# Patient Record
Sex: MALE | Race: BLACK OR AFRICAN AMERICAN | NOT HISPANIC OR LATINO | Employment: OTHER | URBAN - METROPOLITAN AREA
[De-identification: names, ages, dates, MRNs, and addresses within clinical notes are randomized per-mention and may not be internally consistent; named-entity substitution may affect disease eponyms.]

---

## 2022-10-04 ENCOUNTER — TELEPHONE (OUTPATIENT)
Dept: TRANSPLANT | Facility: CLINIC | Age: 67
End: 2022-10-04
Payer: COMMERCIAL

## 2022-10-04 NOTE — LETTER
October 4, 2022    Tj Olguin  [address]  [address]  [facility] [address]  Phone: [phone]  Fax: [phone]    Dear Dr. Tj Olguin:    Patient: Honorio Laguna  MR Number [id number]  Date of Birth 1955    Thank you for your referral of Honorio Laguna to our transplant program.      Your patient's information will be screened by our Referral Nurse Coordinators to determine initial medical candidacy and if any further records are required. We will contact you if further information is needed to process the referral.     Once all needed information is received it will be forwarded to our Transplant Financial Coordinators who will obtain insurance authorization. Upon insurance approval, your patient will be contacted by our  to schedule their appointments with the transplant team. When appointments are made you will receive a copy of the appointment letter that your patient will receive.     This process may take two to six weeks to complete.     In the event your patient is not a candidate a copy of our transplant programs opinion including reasons will be returned to you to comply with your yearly review regulations. A copy of that letter will also be sent to the patient.     The following information is needed to process a referral:  Medicare form 2728 for all patients on dialysis.  Dental clearance is required if your patient has primary Medicaid.  Current immunization record.  This information can be faxed to [phone].  Current Dietician evaluation can be faxed to [phone].    Thank you again for your trust in our program and the care of your patient.  If there is anything we can do for you or your staff, please feel free to contact us at [phone].    Sincerely,   [facility]  Kidney & Kidney/Pancreas Transplant Team  [address] [place] [address] [phone]

## 2022-10-04 NOTE — TELEPHONE ENCOUNTER
Contacted patient to review initial intake information. Patient reports the followin. Can you walk up a flight of stairs without getting short of breath or stopping? Yes   2. Can you walk one block without getting short of breath or having to stop? Yes   3. Do you use oxygen? No   4. Do you use a cane, walker, or wheel chair to assist in mobility? No   5. Have you been hospitalized or had recent surgery in the last 6 months? No    A. Stroke   B. Heart surgery or heart catheterization   C. Broken bone  6. Do you have any cuts, open sores (ulcers), or wounds anywhere on your body? No    7. Do you go to dialysis? No   8. Preferred appointment day? Anyday  9. Caregiver? Wife (Jennifer)    Patient is aware the next steps will include completing records and compliance verification. Patient is aware once provider review and insurance authorization is received we will contact patient to schedule initial visit. Patient is aware that initial visit will begin prior to / at 7 am and will conclude at approximately 3 pm on date of appointment. All questions answered at this time.

## 2022-10-12 ENCOUNTER — TELEPHONE (OUTPATIENT)
Dept: TRANSPLANT | Facility: CLINIC | Age: 67
End: 2022-10-12
Payer: COMMERCIAL

## 2022-11-15 DIAGNOSIS — Z76.82 ORGAN TRANSPLANT CANDIDATE: Primary | ICD-10-CM

## 2022-11-15 DIAGNOSIS — Z91.89 CARDIOVASCULAR EVENT RISK: ICD-10-CM

## 2022-11-15 DIAGNOSIS — Z01.810 HIGH RISK SURGERY, PRE-OPERATIVE CARDIOVASCULAR EXAMINATION: ICD-10-CM

## 2022-12-01 ENCOUNTER — TELEPHONE (OUTPATIENT)
Dept: TRANSPLANT | Facility: CLINIC | Age: 67
End: 2022-12-01
Payer: COMMERCIAL

## 2022-12-01 NOTE — LETTER
Date: 12/1/2022          Referral Process      To: Dialysis Unit  and Charge RN From: [facility] and      Kidney Transplant Nurse Coordinators    RE: Honorio Laguna, 1955, [id number]     At [facility], we conduct adherence checks as an important part of transplant care. Initial and listed patient assessments are not complete without adherence information.        Please complete the following information:                 Data from the last 3 months:  (data from last 3 months preferred):    Number of AMAs with dates, time, and reasons: ____________________________________________________    ______________________________________________________________________________________________    ______________________________________________________________________________________________    Number of No-Shows with dates and reasons: ______________________________________________________      ______________________________________________________________________________________________      Any concerns with Caregivers:  YES / NO    If yes, please explain:  ___________________________________________________________________________    ______________________________________________________________________________________________     Any concerns with Transportation:  YES / NO    If yes, please explain:  ___________________________________________________________________________    ______________________________________________________________________________________________    Any Psychiatric and/or Psychosocial concerns:  YES / NO     If yes, please explain: ___________________________________________________________________________    ______________________________________________________________________________________________      PLEASE RETURN TO: FAX: [phone]     Thank you for collaborating with us in the care of this patient.           [id number]  ?  [place] [address]  ?  phone [phone]  ?  fax [phone]  ?  [email or web address]  Confidentiality notice: The accompanying facsimile is intended solely for the use of the recipient designated above. Document(s) transmitted herewith may contain information that is confidential and privileged. Delivery, distribution or dissemination of this communication other than to the intended recipient is strictly prohibited. If you have received this facsimile in error, please notify us immediately by telephone.

## 2022-12-01 NOTE — LETTER
Date: 12/1/2022          Pre-Dialysis      To: Dialysis Unit  and Charge RN From: [facility] and      Kidney Transplant Nurse Coordinators    RE: Honorio Laguna, 1955, [id number]     At [facility], we conduct adherence checks as an important part of transplant care. Initial and listed patient assessments are not complete without adherence information.        Please complete the following information:                 Data from the last 3 months:  (data from last 3 months preferred):    Number of AMAs with dates, time, and reasons: ____________________________________________________    ______________________________________________________________________________________________    ______________________________________________________________________________________________    Number of No-Shows with dates and reasons: ______________________________________________________      ______________________________________________________________________________________________      Any concerns with Caregivers:  YES / NO    If yes, please explain:  ___________________________________________________________________________    ______________________________________________________________________________________________     Any concerns with Transportation:  YES / NO    If yes, please explain:  ___________________________________________________________________________    ______________________________________________________________________________________________    Any Psychiatric and/or Psychosocial concerns:  YES / NO     If yes, please explain: ___________________________________________________________________________    ______________________________________________________________________________________________      PLEASE RETURN TO: FAX: [phone]     Thank you for collaborating with us in the care of this patient.           [address]  ?  [place] [address]  ?  phone [phone]  ?  fax [phone]  ?  [email or web address]  Confidentiality notice: The accompanying facsimile is intended solely for the use of the recipient designated above. Document(s) transmitted herewith may contain information that is confidential and privileged. Delivery, distribution or dissemination of this communication other than to the intended recipient is strictly prohibited. If you have received this facsimile in error, please notify us immediately by telephone.

## 2022-12-05 ENCOUNTER — TELEPHONE (OUTPATIENT)
Dept: TRANSPLANT | Facility: CLINIC | Age: 67
End: 2022-12-05
Payer: COMMERCIAL

## 2022-12-12 DIAGNOSIS — Z76.82 ORGAN TRANSPLANT CANDIDATE: Primary | ICD-10-CM

## 2022-12-12 DIAGNOSIS — Z91.89 CARDIOVASCULAR EVENT RISK: ICD-10-CM

## 2022-12-12 DIAGNOSIS — Z01.810 HIGH RISK SURGERY, PRE-OPERATIVE CARDIOVASCULAR EXAMINATION: ICD-10-CM

## 2023-01-10 ENCOUNTER — HOSPITAL ENCOUNTER (OUTPATIENT)
Dept: RADIOLOGY | Facility: HOSPITAL | Age: 68
Discharge: HOME | End: 2023-01-10
Attending: NURSE PRACTITIONER
Payer: COMMERCIAL

## 2023-01-10 ENCOUNTER — OFFICE VISIT (OUTPATIENT)
Dept: TRANSPLANT | Facility: CLINIC | Age: 68
End: 2023-01-10
Payer: MEDICARE

## 2023-01-10 ENCOUNTER — HOSPITAL ENCOUNTER (OUTPATIENT)
Dept: RADIOLOGY | Facility: HOSPITAL | Age: 68
Discharge: HOME | End: 2023-01-10
Attending: NURSE PRACTITIONER
Payer: MEDICARE

## 2023-01-10 VITALS
HEART RATE: 68 BPM | WEIGHT: 228.19 LBS | TEMPERATURE: 97 F | HEIGHT: 72 IN | RESPIRATION RATE: 18 BRPM | BODY MASS INDEX: 30.91 KG/M2 | OXYGEN SATURATION: 97 % | SYSTOLIC BLOOD PRESSURE: 142 MMHG | DIASTOLIC BLOOD PRESSURE: 73 MMHG

## 2023-01-10 DIAGNOSIS — Z76.82 ORGAN TRANSPLANT CANDIDATE: ICD-10-CM

## 2023-01-10 DIAGNOSIS — Z91.89 CARDIOVASCULAR EVENT RISK: ICD-10-CM

## 2023-01-10 DIAGNOSIS — E11.65 UNCONTROLLED TYPE 2 DIABETES MELLITUS WITH HYPERGLYCEMIA: ICD-10-CM

## 2023-01-10 DIAGNOSIS — I12.9 HYPERTENSIVE RENAL DISEASE: ICD-10-CM

## 2023-01-10 DIAGNOSIS — N18.4 CKD (CHRONIC KIDNEY DISEASE), STAGE IV: ICD-10-CM

## 2023-01-10 DIAGNOSIS — Z01.810 HIGH RISK SURGERY, PRE-OPERATIVE CARDIOVASCULAR EXAMINATION: ICD-10-CM

## 2023-01-10 DIAGNOSIS — Z01.818 PRE-TRANSPLANT EVALUATION FOR KIDNEY TRANSPLANT: Primary | ICD-10-CM

## 2023-01-10 PROBLEM — E55.9 VITAMIN D DEFICIENCY: Status: ACTIVE | Noted: 2019-04-04

## 2023-01-10 PROBLEM — R80.1 PERSISTENT PROTEINURIA: Status: ACTIVE | Noted: 2019-04-04

## 2023-01-10 PROCEDURE — 99205 PR OFFICE/OUTPT VISIT, NEW, LEVL V, 60-74 MIN: ICD-10-PCS | Mod: S$PBB,TXP,, | Performed by: NURSE PRACTITIONER

## 2023-01-10 PROCEDURE — 71046 X-RAY EXAM CHEST 2 VIEWS: CPT | Mod: TC,TXP

## 2023-01-10 PROCEDURE — 99999 PR PBB SHADOW E&M-EST. PATIENT-LVL V: ICD-10-PCS | Mod: PBBFAC,TXP,, | Performed by: NURSE PRACTITIONER

## 2023-01-10 PROCEDURE — 72170 X-RAY EXAM OF PELVIS: CPT | Mod: 26,TXP,, | Performed by: RADIOLOGY

## 2023-01-10 PROCEDURE — 97802 MEDICAL NUTRITION INDIV IN: CPT | Mod: PBBFAC,TXP

## 2023-01-10 PROCEDURE — 99215 OFFICE O/P EST HI 40 MIN: CPT | Mod: PBBFAC,25,TXP | Performed by: NURSE PRACTITIONER

## 2023-01-10 PROCEDURE — 76770 US RETROPERITONEAL COMPLETE: ICD-10-PCS | Mod: 26,TXP,, | Performed by: RADIOLOGY

## 2023-01-10 PROCEDURE — 72170 XR PELVIS ROUTINE AP: ICD-10-PCS | Mod: 26,TXP,, | Performed by: RADIOLOGY

## 2023-01-10 PROCEDURE — 93978 VASCULAR STUDY: CPT | Mod: TC,TXP

## 2023-01-10 PROCEDURE — 93978 VASCULAR STUDY: CPT | Mod: 26,TXP,, | Performed by: RADIOLOGY

## 2023-01-10 PROCEDURE — 93978 US DOPP ILIACS BILATERAL: ICD-10-PCS | Mod: 26,TXP,, | Performed by: RADIOLOGY

## 2023-01-10 PROCEDURE — 72170 X-RAY EXAM OF PELVIS: CPT | Mod: TC,TXP

## 2023-01-10 PROCEDURE — 99999 PR PBB SHADOW E&M-EST. PATIENT-LVL V: CPT | Mod: PBBFAC,TXP,, | Performed by: NURSE PRACTITIONER

## 2023-01-10 PROCEDURE — 76770 US EXAM ABDO BACK WALL COMP: CPT | Mod: 26,TXP,, | Performed by: RADIOLOGY

## 2023-01-10 PROCEDURE — 99205 OFFICE O/P NEW HI 60 MIN: CPT | Mod: S$PBB,TXP,, | Performed by: NURSE PRACTITIONER

## 2023-01-10 PROCEDURE — 71046 XR CHEST PA AND LATERAL: ICD-10-PCS | Mod: 26,TXP,, | Performed by: RADIOLOGY

## 2023-01-10 PROCEDURE — 71046 X-RAY EXAM CHEST 2 VIEWS: CPT | Mod: 26,TXP,, | Performed by: RADIOLOGY

## 2023-01-10 PROCEDURE — 76770 US EXAM ABDO BACK WALL COMP: CPT | Mod: TC,TXP

## 2023-01-10 RX ORDER — FUROSEMIDE 80 MG/1
80 TABLET ORAL DAILY
COMMUNITY
Start: 2022-12-20

## 2023-01-10 RX ORDER — ATORVASTATIN CALCIUM 20 MG/1
20 TABLET, FILM COATED ORAL NIGHTLY
COMMUNITY
Start: 2022-09-11

## 2023-01-10 RX ORDER — ASPIRIN 81 MG/1
81 TABLET ORAL DAILY
COMMUNITY

## 2023-01-10 RX ORDER — LOSARTAN POTASSIUM 100 MG/1
100 TABLET ORAL DAILY
COMMUNITY
Start: 2022-12-08

## 2023-01-10 RX ORDER — CHOLECALCIFEROL (VITAMIN D3) 25 MCG
1000 TABLET ORAL DAILY
COMMUNITY

## 2023-01-10 RX ORDER — INSULIN GLARGINE AND LIXISENATIDE 100; 33 U/ML; UG/ML
45 INJECTION, SOLUTION SUBCUTANEOUS DAILY
COMMUNITY
Start: 2023-01-07

## 2023-01-10 RX ORDER — FOLIC ACID/MULTIVIT,IRON,MINER .4-18-35
1 TABLET,CHEWABLE ORAL DAILY
COMMUNITY

## 2023-01-10 RX ORDER — AMLODIPINE BESYLATE 10 MG/1
10 TABLET ORAL DAILY
COMMUNITY
Start: 2022-12-20

## 2023-01-10 RX ORDER — CARVEDILOL 25 MG/1
25 TABLET ORAL 2 TIMES DAILY
COMMUNITY
Start: 2022-12-05

## 2023-01-10 RX ORDER — TRIAMCINOLONE ACETONIDE 1 MG/G
CREAM TOPICAL
COMMUNITY
Start: 2022-10-03

## 2023-01-10 RX ORDER — ALLOPURINOL 100 MG/1
100 TABLET ORAL DAILY
COMMUNITY
Start: 2022-12-07

## 2023-01-10 NOTE — PROGRESS NOTES
PHARM.D. PRE-TRANSPLANT NOTE:    This patient's medication therapy was evaluated as part of his pre-transplant evaluation.      The following general pharmacologic concerns were noted: Aspirin will increase periop bleeding risk;     The following concerns for post-operative pain management were noted: N/A    The following pharmacologic concerns related to HCV therapy were noted: Atorvastatin interacts with DAA therapy therefore should be transitioned to rosuvastatin prior to initiating (max: 10 mg)      This patient's medication profile was reviewed for considerations for DAA Hepatitis C therapy:    [x]  No current inducers of CYP 3A4 or PGP  [x]  No amiodarone on this patient's EMR profile in the last 24 months  [x]  No past or current atrial fibrillation on this patient's EMR profile       Current Outpatient Medications   Medication Sig Dispense Refill    allopurinoL (ZYLOPRIM) 100 MG tablet Take 100 mg by mouth once daily.      amLODIPine (NORVASC) 10 MG tablet Take 10 mg by mouth once daily.      aspirin (ECOTRIN) 81 MG EC tablet Take 81 mg by mouth once daily.      atorvastatin (LIPITOR) 20 MG tablet Take 20 mg by mouth every evening.      carvediloL (COREG) 25 MG tablet Take 25 mg by mouth 2 (two) times daily.      furosemide (LASIX) 80 MG tablet Take 80 mg by mouth once daily.      losartan (COZAAR) 100 MG tablet Take 100 mg by mouth once daily.      multivit-min-iron-folic-vit K1 (CENTRUM CHEWABLES) 8 mg-400 mcg- 10 mcg Chew Take 1 tablet by mouth once daily.      SOLIQUA 100/33 100 unit-33 mcg/mL InPn pen Inject 45 Units into the skin once daily.      triamcinolone acetonide 0.1% (KENALOG) 0.1 % cream SMARTSIG:Sparingly Topical Twice Daily      vitamin D (VITAMIN D3) 1000 units Tab Take 1,000 Units by mouth once daily.       No current facility-administered medications for this visit.           I am available for consultation and can be contacted, as needed by the other members of the Transplant team.

## 2023-01-10 NOTE — LETTER
January 12, 2023        Tj Olguin  [address]  [address]  [facility] [address]  Phone: [phone]  Fax: [phone]             [facility]- Transplant 1st Fl  [address]  [facility] [address]  Phone: [phone]   Patient: Honorio Laguna   MR Number: [id number]   YOB: 1955   Date of Visit: 1/10/2023       Dear Dr. Tj Olguin    Thank you for referring Honorio Laguna to me for evaluation. Attached you will find relevant portions of my assessment and plan of care.    If you have questions, please do not hesitate to call me. I look forward to following Honorio Laguna along with you.    Sincerely,    Eveline Graham, CHEPE    Enclosure    If you would like to receive this communication electronically, please contact [email or web address] or [phone] to request [employer] access.    [employer] is a tool which provides read-only access to select patient information with whom you have a relationship. Its easy to use and provides real time access to review your patients record including encounter summaries, notes, results, and demographic information.    If you feel you have received this communication in error or would no longer like to receive these types of communications, please e-mail [email or web address]

## 2023-01-10 NOTE — PROGRESS NOTES
INITIAL PATIENT EDUCATION NOTE    Mr. Honorio Laguna was seen in pre-kidney transplant clinic for evaluation for kidney, kidney/pancreas or pancreas only transplant.  The patient attended a an individual video education session that discussed/reviewed the following aspects of transplantation: evaluation including diagnostic and laboratory testing,( Chemistries, Hematology, Serologies including HIV and Hepatitis and HLA) required for transplantation and selection committee process, UNOS waitlist management/multiple listings, types of organs offered (KDPI < 85%, KDPI > 85%, PHS risk, DCD, HCV+, HIV+ for HIV+ recipients and enbloc/dual), financial aspects, surgical procedures, dietary instruction pre- and post-transplant, health maintenance pre- and post-transplant, post-transplant hospitalization and outpatient follow-up, potential to participate in a research protocol, and medication management and side effects.  A question and answer session was provided after the presentation.    The patient was seen by all members of the multi-disciplinary team to include: Nephrologist/JENNIE, Surgeon, , Transplant Coordinator, , Pharmacist and Dietician (if applicable).    The patient reviewed and signed all consents for evaluation which were witnessed and sent to scanning into the [facility] chart.    The patient was given an education book and plan for further evaluation based on his individual assessment.      Reviewed program requirement for complete COVID vaccination with documentation prior to listing.  COVID education information reviewed with patient. Patient encouraged to be up to date on all vaccinations.       The patient was informed that the transplant team would manage immediate post op pain. If the patient requires long term pain management, they will need to have that pain management addressed by their PCP or previous provider who wrote for long term pain medicines.    The patient was  encouraged to call with any questions or concerns.

## 2023-01-10 NOTE — PROGRESS NOTES
Transplant Nephrology  Kidney Transplant Recipient Evaluation    Referring Physician: Tj Olguin  Current Nephrologist: Tj Olguin    Subjective:   CC:  Initial evaluation of kidney transplant candidacy.    HPI:  Mr. Laguna is a 67 y.o. year old Black or  male who has presented to be evaluated as a potential kidney transplant recipient.  He has advanced kidney disease secondary to presumed diabetic nephropathy and HTN, has not had a renal biopsy.  Patient is currently pre-dialysis, does not have a dialysis access.    DM2 and HTN since 2013.   On soliqua, A1c today 9.5. He is getting continuous glucose monitor soon. Denies retinopathy, neuropathy, or gastroparesis.    Works as a . Able to climb a flight of stairs without CP or SOB. Looks good, not frail.    Last colonoscopy done at [facility], reports good for 10 years.    Denies MI, CVA, DVT/PE, or malignancy history.    No potential donors at  this time.    Current Outpatient Medications   Medication Sig Dispense Refill    allopurinoL (ZYLOPRIM) 100 MG tablet Take 100 mg by mouth once daily.      amLODIPine (NORVASC) 10 MG tablet Take 10 mg by mouth once daily.      aspirin (ECOTRIN) 81 MG EC tablet Take 81 mg by mouth once daily.      atorvastatin (LIPITOR) 20 MG tablet Take 20 mg by mouth every evening.      carvediloL (COREG) 25 MG tablet Take 25 mg by mouth 2 (two) times daily.      furosemide (LASIX) 80 MG tablet Take 80 mg by mouth once daily.      losartan (COZAAR) 100 MG tablet Take 100 mg by mouth once daily.      multivit-min-iron-folic-vit K1 (CENTRUM CHEWABLES) 8 mg-400 mcg- 10 mcg Chew Take 1 tablet by mouth once daily.      SOLIQUA 100/33 100 unit-33 mcg/mL InPn pen Inject 45 Units into the skin once daily.      triamcinolone acetonide 0.1% (KENALOG) 0.1 % cream SMARTSIG:Sparingly Topical Twice Daily      vitamin D (VITAMIN D3) 1000 units Tab Take 1,000 Units by mouth once daily.       No current  facility-administered medications for this visit.       Past Medical History:   Diagnosis Date    Cataract     Diabetes mellitus     Diabetes mellitus, type 2     Disorder of kidney and ureter     Hyperlipidemia     Hypertension     Hyperuricemia     Skin plaque     Vitamin D deficiency      Past Medical and Surgical History: Mr. Laguna  has a past medical history of Cataract, Diabetes mellitus, Diabetes mellitus, type 2, Disorder of kidney and ureter, Hyperlipidemia, Hypertension, Hyperuricemia, Skin plaque, and Vitamin D deficiency.  He has a past surgical history that includes Cataract extraction (Bilateral).    Past Social and Family History: Mr. Laguna reports that he quit smoking about 32 years ago. His smoking use included cigarettes. He has never used smokeless tobacco. He reports that he does not currently use alcohol. He reports that he does not use drugs. His family history includes Diabetes in his brother; Kidney disease in his brother.    Review of Systems   Constitutional:  Positive for fatigue. Negative for activity change, appetite change and fever.   HENT:  Negative for congestion, mouth sores and sore throat.    Eyes:  Negative for visual disturbance.   Respiratory:  Negative for cough, chest tightness and shortness of breath.    Cardiovascular:  Negative for chest pain, palpitations and leg swelling.   Gastrointestinal:  Negative for abdominal distention, abdominal pain, constipation, diarrhea and nausea.   Genitourinary:  Negative for difficulty urinating, frequency and hematuria.   Musculoskeletal:  Negative for arthralgias and gait problem.   Skin:  Negative for wound.   Allergic/Immunologic: Negative for environmental allergies, food allergies and immunocompromised state.   Neurological:  Negative for dizziness, weakness and numbness.   Psychiatric/Behavioral:  Negative for sleep disturbance. The patient is not nervous/anxious.      Objective:   Blood pressure (!) 142/73, pulse 68, temperature  "97.3 °F (36.3 °C), temperature source Temporal, resp. rate 18, height 6' 0.13" (1.832 m), weight 103.5 kg (228 lb 2.8 oz), SpO2 97 %.body mass index is 30.84 kg/m².    Physical Exam  Vitals and nursing note reviewed.   Constitutional:       Appearance: Normal appearance.   HENT:      Head: Normocephalic.   Cardiovascular:      Rate and Rhythm: Normal rate and regular rhythm.      Heart sounds: Normal heart sounds.   Pulmonary:      Effort: Pulmonary effort is normal.      Breath sounds: Normal breath sounds.   Abdominal:      General: Bowel sounds are normal. There is no distension.      Palpations: Abdomen is soft.      Tenderness: There is no abdominal tenderness.   Musculoskeletal:         General: Normal range of motion.   Skin:     General: Skin is warm and dry.   Neurological:      General: No focal deficit present.      Mental Status: He is alert.   Psychiatric:         Behavior: Behavior normal.       Labs:  Lab Results   Component Value Date    WBC 6.77 01/10/2023    HGB 12.9 (L) 01/10/2023    HCT 38.6 (L) 01/10/2023     01/10/2023    K 4.0 01/10/2023     01/10/2023    CO2 29 01/10/2023    BUN 43 (H) 01/10/2023    CREATININE 3.8 (H) 01/10/2023    CALCIUM 9.7 01/10/2023    PHOS 3.7 01/10/2023    ALBUMIN 3.8 01/10/2023    AST 29 01/10/2023    ALT 28 01/10/2023    .8 (H) 01/10/2023       Labs were reviewed with the patient.    Assessment:     1. Pre-transplant evaluation for kidney transplant    2. CKD (chronic kidney disease), stage IV    3. Uncontrolled type 2 diabetes mellitus with hyperglycemia    4. Hypertensive renal disease    5. BMI 30.0-30.9,adult        Plan:   67 y.o. year old Black or  male who has presented to be evaluated as a potential kidney transplant recipient.  He has advanced kidney disease secondary to presumed diabetic nephropathy and HTN, has not had a renal biopsy.  Needs colonoscopy records from [facility].    A1c 9.5 today, encouraged better DM2 control. " He is waiting on continuous glucose monitor.     Transplant Candidacy:   Based on available information, Mr. Laguna is a suitable kidney transplant candidate.   Meets center eligibility for accepting HCV+ donor offer - yes.  Patient educated on HCV+ donors. Honorio is willing to accept HCV+ donor offer - yes   Patient is a candidate for KDPI > 85 kidney donor offer - no (weight > 88 kg)  Final determination of transplant candidacy will be made once workup is complete and reviewed by the selection committee.    Patient advised that it is recommended that all transplant candidates, and their close contacts and household members receive Covid vaccination.    Eveline Graham, CHEPE       OS Patient Status  Functional Status: 90% - Able to carry on normal activity: minor symptoms of disease  Physical Capacity: No Limitations

## 2023-01-10 NOTE — PROGRESS NOTES
Transplant Surgery  Kidney Transplant Recipient Evaluation    Referring Physician: Tj Olguin  Current Nephrologist: Tj Olguin    Subjective:     Reason for Visit: evaluate transplant candidacy    History of Present Illness: Honorio Laguna is a 67 y.o. year old male undergoing transplant evaluation.    Dialysis History: Honorio is pre-dialysis.      Transplant History: N/A    Etiology of Renal Disease: Diabetes Mellitus - Type II (based on medical records from referral).    External provider notes reviewed: Yes    Review of Systems   Constitutional:  Positive for fatigue.   Respiratory: Negative.     Cardiovascular: Negative.    Gastrointestinal: Negative.    Genitourinary: Negative.    Objective:     Physical Exam:  Constitutional:   Vitals reviewed: yes   Well-nourished and well-groomed: yes  Eyes:   Sclerae icteric: no   Extraocular movements intact: yes  GI:    Bowel sounds normal: yes   Tenderness: no    If yes, quadrant/location: not applicable   Palpable masses: no    If yes, quadrant/location: not applicable   Hepatosplenomegaly: no   Ascites: no   Hernia: no    If yes, type/location: not applicable   Surgical scars: no    If yes, type/location: not applicable  Resp:   Effort normal: yes   Breath sounds normal: yes    CV:   Regular rate and rhythm: yes   Heart sounds normal: yes   Femoral pulses normal: yes   Extremities edematous: no  Skin:   Rashes or lesions present: no    If yes, describe:not applicable   Jaundice:: no    Musculoskeletal:   Gait normal: yes   Strength normal: yes  Psych:   Oriented to person, place, and time: yes   Affect and mood normal: yes    Additional comments: not applicable    Diagnostics:  The following labs have been reviewed: CBC  CMP  PT  INR  The following radiology images have been independently reviewed and interpreted: Pending    Counseling: We provided Honorio Laguna with a group education session today.  We discussed kidney transplantation at length with him, including  risks, potential complications, and alternatives in the management of his renal failure.  The discussion included complications related to anesthesia, bleeding, infection, primary nonfunction, and ATN.  I discussed the typical postoperative course, length of hospitalization, the need for long-term immunosuppression, and the need for long-term routine follow-up.  I discussed living-donor and -donor transplantation and the relative advantages and disadvantages of each.  I also discussed average waiting times for both living donation and  donation.  I discussed national and center-specific survival rates.  I also mentioned the potential benefit of multicenter listing to candidates listed with centers within more than one organ procurement organization.  All questions were answered.    Patient advised that it is recommended that all transplant candidates, and their close contacts and household members receive Covid vaccination.    Final determination of transplant candidacy will be made once evaluation is complete and reviewed by the Kidney & Kidney/Pancreas Selection Committee.    Coronavirus disease (COVID-19) caused by severe acute respiratory virus coronavirus 2 (SARS-C0V 2) is associated with increased mortality in solid organ transplant recipients (SOT) compared to non-transplant patients. Vaccine responses to vaccination are depressed against SARS-CoV2 compared to normal individuals but improve with third vaccination doses. Vaccination prior to SOT provides both the best opportunity for transplant candidates to develop protective immunity and to reduce the risk of serious COVID19 infections post transplantation. Organ transplant candidates at [facility] will be required to receive SARS-CoV-2 vaccination prior to being listed with a an active status, whenever possible. Exceptions will be made for disability related reasons or for sincerely held Yazidism beliefs.           Transplant Surgery - Candidacy   Assessment/Plan:   Honorio Laguna is pre-dialysis with CKD stage 4 (GFR 15-29 mL/min). I see no surgical contraindication to placing a kidney transplant. Based on available information, Honorio Laguna is a suitable kidney transplant candidate.       Abdominal Habitus: Not prohibitive.     Vascular/Technical Concerns: None based on exam, imaging is pending.  No reported history of vascular disease, no femoral catheters, no lower extremity grafts, no iliofemoral venous thromboembolism. The patient is not on anticoagulation.     Urologic Concerns: None based on available information. Patient reports normal volume of urine per day.  No history of recurrent UTI, no apparent obstructive symptoms or voiding dysfunction, no history of self catheterization    Other surgical considerations/concerns:  None     Additional testing to be completed according to the Written Order Guidelines for Adult Pre-kidney and Pancreas Transplant Evaluation (KI-02).  Interpretation of tests and discussion of patient management involves all members of the multidisciplinary transplant team.    Gerber Fatima MD

## 2023-01-10 NOTE — PROGRESS NOTES
TRANSPLANT NUTRITIONAL ASSESSMENT    Referring Provider: Tj Olguin MD     Reason for Visit: Pre-kidney transplant work-up (pre-dialysis)    Age: 67 y.o.  Sex: male    Patient Active Problem List   Diagnosis    Hypertensive renal disease    Persistent proteinuria    Uncontrolled type 2 diabetes mellitus with hyperglycemia    Vitamin D deficiency    Pre-transplant evaluation for kidney transplant    CKD (chronic kidney disease), stage IV     Past Medical History:   Diagnosis Date    Cataract     Diabetes mellitus     Diabetes mellitus, type 2     Disorder of kidney and ureter     Hyperlipidemia     Hypertension     Hyperuricemia     Skin plaque     Vitamin D deficiency      Lab Results   Component Value Date     (H) 01/10/2023    K 4.0 01/10/2023    PHOS 3.7 01/10/2023    CHOL 236 (H) 01/10/2023    HDL 36 (L) 01/10/2023    TRIG 304 (H) 01/10/2023    ALBUMIN 3.8 01/10/2023    HGBA1C 9.5 (H) 01/10/2023    CALCIUM 9.7 01/10/2023     Other Pertinent Labs: N/A    Current Outpatient Medications   Medication Sig    allopurinoL (ZYLOPRIM) 100 MG tablet Take 100 mg by mouth once daily.    amLODIPine (NORVASC) 10 MG tablet Take 10 mg by mouth once daily.    aspirin (ECOTRIN) 81 MG EC tablet Take 81 mg by mouth once daily.    atorvastatin (LIPITOR) 20 MG tablet Take 20 mg by mouth every evening.    carvediloL (COREG) 25 MG tablet Take 25 mg by mouth 2 (two) times daily.    furosemide (LASIX) 80 MG tablet Take 80 mg by mouth once daily.    losartan (COZAAR) 100 MG tablet Take 100 mg by mouth once daily.    multivit-min-iron-folic-vit K1 (CENTRUM CHEWABLES) 8 mg-400 mcg- 10 mcg Chew Take 1 tablet by mouth once daily.    SOLIQUA 100/33 100 unit-33 mcg/mL InPn pen Inject 45 Units into the skin once daily.    triamcinolone acetonide 0.1% (KENALOG) 0.1 % cream SMARTSIG:Sparingly Topical Twice Daily    vitamin D (VITAMIN D3) 1000 units Tab Take 1,000 Units by mouth once daily.     No current facility-administered  "medications for this visit.     Allergies: Patient has no known allergies.    Ht Readings from Last 1 Encounters:   01/10/23 6' 0.13" (1.832 m)     Wt Readings from Last 1 Encounters:   01/10/23 103.5 kg (228 lb 2.8 oz)      BMI: Body mass index is 30.84 kg/m².    Usual Weight: 218 lb  Weight Change/Time: stable  Current Diet: regular   Appetite/Current Intake: great  Exercise/Physical Activity: functional in ADLs   Nutritional/Herbal Supplements: silver centrum  Chewing/Swallowing Problems: none  Symptoms: none    Estimated Kcal Need: 2070 kcal/day (20 kcal/kg)   Estimated Protein Need:  gm/day (0.8-1.0 gm/kg)     Nutritional History:   Pt and wife present. Pt reports consumes most midday meals at work ([employer]). Wife does the cooking and the grocery shopping.     Diet Recall    Morning: coffee (creamer), biscuit (jelly); oatmeal (sugar alternative, brown sugar, nuts, butter)     Midday: [employer] fish robert with fries; fried chicken; grilled chicken and salad from work     Evening: smothered pork chops, cabbage, rice                 Enjoys beef, seafood, broccoli, corn     Snacks: Belvita     Desserts: cookies, chocolate covered almonds, sugar free candy, butter pecan ice cream     Beverages: 4-5 16.9oz bottled water/day, diet coke/dr magana      Seasonings: seasonal, salt    Restaurants/Fast Food: 1x/wk     Nutritional Diagnoses  Problem: food- and nutrition-related knowledge deficit  Etiology: lack of previous education on pre-kidney transplant nutrition recommendations  Symptoms: diet recall and questions from pt    Educational Need? yes  Barriers: none identified  Discussed with: patient and spouse  Interventions: Patient taught nutrition information regarding Pre-kidney transplant work-up (pre-dialysis). Renal Nutrition Therapy packet reviewed (high/low food sources of K, Phos and protein, low sodium and fluid intake, emphasis on moderate protein intake). Encouraged physical activity daily, " regular exercise as tolerated, stay mobile.   Goals/Recommendations: diet adherence  Actions Taken: instruct/provide written information  Patient and/or family comprehend instructions: yes  Outcome: Verbalizes understanding  Monitoring: Contact information provided, will f/u in clinic and communicate with the care team as needed.     Counseling Time: 20 minutes

## 2023-01-12 NOTE — PROGRESS NOTES
Transplant Recipient Adult Psychosocial Assessment    Honorio Laguna  [address]  [facility] [address]  Telephone Information:   Mobile [phone]   Home  [phone] (home)  Work  There is no work phone number on file.  E-mail  No e-mail address on record    Sex: male  YOB: 1955  Age: 67 y.o.    Encounter Date: 1/10/2023  [place] Citizen: yes  Primary Language: English   Needed: no    Emergency Contact:  Jennifer Laguna, 72 yo wife, YFN, does drive/own car, retired from hotel housekeeping. [phone]    Family/Social Support:   Number of dependents/: pt denies  Marital history:  to Jennifer 46 years.  Other family dynamics: Pt reports highly supportive family living nearby. Pt reports wife Jennifer, son Honorio and step-daughter Arpita Haley (all live in [facility]) will all be assisting with organ transplant. Pt reports 3 brother which live nearby but unsure if they will be able to assist with pt's transplant.      Household Composition:  Jennifer Laguna, 72 yo wife, YFN, does drive/own car, retired from hotel housekeeping. [phone]    Do you and your caregivers have access to reliable transportation? yes  PRIMARY CAREGIVER: tee Carson, will be primary caregiver, phone number  [phone]     provided in-depth information to patient and caregiver regarding pre- and post-transplant caregiver role.   strongly encourages patient and caregiver to have concrete plan regarding post-transplant care giving, including back-up caregiver(s) to ensure care giving needs are met as needed.    Patient and Caregiver states understanding all aspects of caregiver role/commitment and is able/willing/committed to being caregiver to the fullest extent necessary.    Patient and Caregiver verbalizes understanding of the education provided today and caregiver responsibilities.         remains available. Patient and Caregiver agree to  contact  in a timely manner if concerns arise.      Able to take time off work without financial concerns: yes.     Additional Significant Others who will Assist with Transplant:  Honorio Laguna Jr, 44 yo son, YFN, does drive/own car, self employed [employer]. [phone]  Arpita Haley, 48 yo step daughter, YFN, does drive/own car, works for [employer]. [phone]    Living Will: no  Healthcare Power of : no  Advance Directives on file: <<no information> per medical record.  Verbally reviewed LW/HCPA information.   provided patient with copy of LW/HCPA documents and provided education on completion of forms.    Living Donors: Education and resource information given to patient.    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 Pt reports receives $484 per month [employer] assisted and $2,600 per month from [employer] then rest of income would be from any film industry he earns.    Able to afford all costs now and if transplanted, including medications: yes  Patient and Caregiver verbalizes understanding of personal responsibilities related to transplant costs and the importance of having a financial plan to ensure that patients transplant costs are fully covered.       provided fundraising information/education. Patient and  Caregiververbalizes understanding.   remains available.    Insurance:   Payer/Plan Subscr  Sex Relation Sub. Ins. ID Effective Group Num   1. BLUE CROSS BL* ARUN MEDINA 1955 Male Self [id number]                                   [address] [address]   2. MEDICARE - ME* ARUN MEDINA 1955 Male Self [id number]                                    [address]     Primary Insurance (for UNOS reporting): Private Insurance  Secondary Insurance (for UNOS reporting): Public Insurance - Medicare FFS (Fee For Service)  Patient and Caregiver verbalizes clear understanding that patient may experience difficulty obtaining and/or be denied insurance coverage post-surgery. This includes and is not limited to disability insurance, life insurance, health insurance, burial insurance, long term care insurance, and other insurances.      Patient and Caregiver also reports understanding that future health concerns related to or unrelated to transplantation may not be covered by patient's insurance.  Resources and information provided and reviewed.     Patient and Caregiver provides verbal permission to release any necessary information to outside resources for patient care and discharge planning.  Resources and information provided are reviewed.      Dialysis Adherence: Pt reports is not on dialysis at this time.    Infusion Service: patient utilizing? no  Home Health: patient utilizing? no  DME: yes glucometer and bpc  Pulmonary/Cardiac Rehab: pt denies   ADLS:  independent    Adherence:   Pt reports high medical compliance with self care, medication management and does drive self/own car.  Adherence education and counseling provided.     Per History Section:  Past Medical History:   Diagnosis Date    Cataract     Diabetes mellitus     Diabetes mellitus, type 2     Disorder of kidney and ureter     Hyperlipidemia     Hypertension     Hyperuricemia     Skin plaque     Vitamin D deficiency       Social History     Tobacco Use    Smoking status: Former     Types: Cigarettes     Quit date:      Years since quittin.0    Smokeless tobacco: Never   Substance Use Topics    Alcohol use: Not Currently     Social History     Substance and Sexual Activity   Drug Use Never     Social History     Substance and Sexual Activity   Sexual Activity Not Currently       Per Today's Psychosocial:  Tobacco: none, patient denies any use.  Alcohol: none, patient denies any use.  Illicit Drugs/Non-prescribed Medications: none, patient denies any use.    Patient and Caregiver states clear understanding of the potential impact of substance use as it relates to transplant candidacy and is aware of possible random substance screening.  Substance abstinence/cessation counseling, education and resources provided and reviewed.     Arrests/DWI/Treatment/Rehab: patient denies    Psychiatric History:    Mental Health: Pt denies any mental health history or current mental health concerns. Pt denies any need for mental health referral at this time.  Psychiatrist/Counselor: pt denies  Medications:  pt denies  Suicide/Homicide Issues: pt denies any si/hi history  Safety at home: pt reports is living in safe home environment with no abuse.    Knowledge: Patient and Caregiver states having clear understanding and realistic expectations regarding the potential risks and potential benefits of organ transplantation and organ donation and agrees to discuss with health care team members and support system members, as well as to utilize available resources and express questions and/or concerns in order to further facilitate the pt informed decision-making.  Resources and information provided and reviewed.    Patient and Caregiver is aware of [facility]'s affiliation and/or partnership with agencies in home health care, LTAC, SNF, [facility], and other hospitals and clinics.    Understanding: Patient and Caregiver reports having a clear understanding  of the many lifetime commitments involved with being a transplant recipient, including costs, compliance, medications, lab work, procedures, appointments, concrete and financial planning, preparedness, timely and appropriate communication of concerns, abstinence (ETOH, tobacco, illicit non-prescribed drugs), adherence to all health care team recommendations, support system and caregiver involvement, appropriate and timely resource utilization and follow-through, mental health counseling as needed/recommended, and patient and caregiver responsibilities.  Social Service Handbook, resources and detailed educational information provided and reviewed.  Educational information provided.    Patient and Caregiver also reports current and expected compliance with health care regime and states having a clear understanding of the importance of compliance.      Patient and Caregiver reports a clear understanding that risks and benefits may be involved with organ transplantation and with organ donation.       Patient and Caregiver also reports clear understanding that psychosocial risk factors may affect patient, and include but are not limited to feelings of depression, generalized anxiety, anxiety regarding dependence on others, post traumatic stress disorder, feelings of guilt and other emotional and/or mental concerns, and/or exacerbation of existing mental health concerns.  Detailed resources provided and discussed.      Patient and Caregiver agrees to access appropriate resources in a timely manner as needed and/or as recommended, and to communicate concerns appropriately.  Patient and Caregiver also reports a clear understanding of treatment options available.     Patient and Caregiver received education in a group setting.   reviewed education, provided additional information, and answered questions.    Feelings or Concerns: Pt reports high motivation to pursue kidney organ transplant at this  time.    Coping: Identify Patient & Caregiver Strategies to [place]:   1. Currently & Pre-transplant - family support; heidi and prayer; enjoys watching football and basket ball.   2. At the time of surgery - family support; heidi and prayer   3. During post-Transplant & Recovery Period - family support; heidi and prayer    Goals: Pt reports hope for successful kidney organ transplant so he will not be placed on dialysis and so he will have a healthier life. Patient referred to Vocational Rehabilitation.    Interview Behavior: Patient and Caregiver presents as alert and oriented x 4, pleasant, good eye contact, well groomed, recall good, concentration/judgement good, average intelligence, calm, communicative, cooperative, and asking and answering questions appropriately. Pt's highly supportive wife Jennifer in session with patient's permission.         Transplant Social Work - Candidacy  Assessment/Plan:     Psychosocial Suitability: Patient presents as low risk candidate for kidney transplant at this time. Based on psychosocial risk factors, patient presents as low risk due to patient denying any psychosocial barriers to kidney organ transplant at this time. Pt reports having organ transplant caregiver/transportation plan, medical insurance plan and plan to afford transplant costs all in place.    Recommendations/Additional Comments: Pt reports lives in [facility] will be discharged home with assistance from family after transplant hospitalization.    SW recommends that pt conduct fundraising to assist pt with pay for cost of medications, food, gas, and other transplant related needs.  SW recommends that pt remain aware of potential mental health concerns and contact the team if any concerns arise.  SW recommends that pt remain abstinent from tobacco, ETOH, and drug use.  SW supports pt's continued adherence. SW remains available to answer any questions or concerns that arise as the pt moves through the transplant process.       Final determination of transplant candidacy will be reviewed by the selection committee.      Jannie THOMAS [facility]

## 2023-06-13 ENCOUNTER — TELEPHONE (OUTPATIENT)
Dept: TRANSPLANT | Facility: CLINIC | Age: 68
End: 2023-06-13
Payer: MEDICARE

## 2023-06-13 NOTE — TELEPHONE ENCOUNTER
Spoke with patient regarding outstanding colonoscopy records. Informed colonoscopy records were requested from [facility] without success. Patient reports he has a copy of his colonoscopy report, and he will have records faxed in. Confirmed fax number with patient. Denies other questions or concerns.

## 2023-08-01 ENCOUNTER — TELEPHONE (OUTPATIENT)
Dept: TRANSPLANT | Facility: CLINIC | Age: 68
End: 2023-08-01
Payer: MEDICARE

## 2023-08-01 NOTE — TELEPHONE ENCOUNTER
MA notes per Dr Tj Olguin    FOR THE PAST THREE MONTHS:    0-AMA's  0-No-shows    No concerns with care giving, transportation, or mental health    MD Mirna Soliman MA  Hey mr. Laguna is a great person and patient. No issues. No missed appointments. I'll see him later this month; does not need dialysis yet but is getting closer.     Mirna Massey  Abdominal Transplant MA

## 2023-08-04 ENCOUNTER — COMMITTEE REVIEW (OUTPATIENT)
Dept: TRANSPLANT | Facility: CLINIC | Age: 68
End: 2023-08-04
Payer: MEDICARE

## 2023-08-04 DIAGNOSIS — Z76.82 ORGAN TRANSPLANT CANDIDATE: Primary | ICD-10-CM

## 2023-08-04 NOTE — COMMITTEE REVIEW
Native Organ Dx: Diabetes Mellitus - Type II      SELECTION COMMITTEE NOTE    Honorio Laguna was presented at selection committee on 8/4/23.  Patient met selection criteria for kidney transplant related to CKD, Stage 4 due to  Diabetes Mellitus - Type II.  No absolute contraindications to transplant at this time. Patient will be placed on the cadaveric wait list pending medication coverage and final financial approval from insurance company. Patient will return to clinic for routine appointment in 6 month(s). Patient does not meet criteria for High KDPI kidney offer. Patient meets HCV+ kidney offer. Patient does not meet criteria for dual/enbloc, due to weight.    Pending insurance - no med coverage.    Called patient to inform him of committee decision. No answer 7 I was unable to leave message due to voicemail box full.     Note written by Ramesh Alston RN.    ===============================================    I was present at the meeting and attest to the decision of the committee.    Joao Castillo  08/07/2023

## 2023-08-04 NOTE — LETTER
August 4, 2023    Tj Olguin  [address]   [address]  [facility] [address]  Phone: [phone]  Fax: [phone]     Dear Dr. Tj Olguin    Patient: Honorio Laguna   MR Number: [id number]   YOB: 1955     Your patient, Honorio Laguna, was recently discussed at the [facility] Selection Committee.  I am happy to inform you that Honorio has been approved for transplantation pending  medication coverage . He has met selection criteria for a kidney transplant related to CKD stage 5 secondary to primary diagnosis of Diabetes Mellitus - Type II. Your patient will be placed on the cadaveric wait list pending final financial approval from insurance company.     We appreciate your confidence in allowing us to participate in your patients care.      If you have any questions or concerns, please do not hesitate to contact me.    Sincerely,      Dayna Delgadillo MD  Medical Director, Kidney & Kidney/Pancreas Transplantation  jr  Cc: Honorio Laguna (Patient)

## 2023-10-17 ENCOUNTER — LAB VISIT (OUTPATIENT)
Dept: LAB | Facility: HOSPITAL | Age: 68
End: 2023-10-17
Payer: MEDICARE

## 2023-10-17 DIAGNOSIS — Z76.82 ORGAN TRANSPLANT CANDIDATE: ICD-10-CM

## 2023-10-17 LAB — ABO + RH BLD: NORMAL

## 2023-10-17 PROCEDURE — 86900 BLOOD TYPING SEROLOGIC ABO: CPT | Mod: TXP | Performed by: NURSE PRACTITIONER

## 2023-10-17 PROCEDURE — 36415 COLL VENOUS BLD VENIPUNCTURE: CPT | Mod: TXP | Performed by: NURSE PRACTITIONER

## 2023-10-17 PROCEDURE — 86832 HLA CLASS I HIGH DEFIN QUAL: CPT | Mod: PO,TXP | Performed by: NURSE PRACTITIONER

## 2023-10-17 PROCEDURE — 86833 HLA CLASS II HIGH DEFIN QUAL: CPT | Mod: PO,TXP | Performed by: NURSE PRACTITIONER

## 2023-10-19 ENCOUNTER — TELEPHONE (OUTPATIENT)
Dept: TRANSPLANT | Facility: CLINIC | Age: 68
End: 2023-10-19
Payer: MEDICARE

## 2023-10-19 DIAGNOSIS — Z76.82 ORGAN TRANSPLANT CANDIDATE: Primary | ICD-10-CM

## 2023-10-19 NOTE — LETTER
2023    Honorio Laguna  [address]  [facility] [address]    Dear Honorio Laguna:  MRN: [id number]    Congratulations! On 10/19/2023, you were placed on  the waiting list for a  donor transplant.    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    Your transplant coordinator while on the waiting list is Fausto Bales RN. They can be reached at [phone] or [phone] with any questions.      What to do now?    Ask your living donors to call and begin testing  Give your donors our phone number, [phone]  Make sure your donors have your name and date of birth when they call  You will get transplanted much faster if you have a living donor    Have your blood sent to our Transplant Lab every month  If you are on dialysis - our Transplant Lab will work with your dialysis unit to send your blood every month  If you are not on dialysis   If you live near an [facility] lab, we will schedule you to have blood drawn every month  If you do not live near an [facility] lab, you will be sent blood kits in the mail. You will need to take a kit to your local lab or doctor to have your blood drawn every month and mail to the Transplant Lab.     Call us with ANY CHANGES  Phone numbers - we must be able to reach you anytime of the day or night when a kidney is available  Address  Insurance coverage  Dialysis unit or kidney doctor  Rudy: if you have surgery, stay in the hospital, have to get blood, or have an infection    Review your Kidney/Kidney-Pancreas Transplant Guide   This will give you detailed information about what happens when  you are on the waiting list   you are called when a kidney is available    The [facility] has a transplant surgeon and physician available 365 days a year, 24 hours a day to coordinate organ acceptance, procurement, surgical placement and to address urgent patient care  issues.  You will be notified in writing of any changes to our Transplant Centers staffing plan that would impact your ability to receive a transplant.    Attached is a letter from the United Network for Organ Sharing (UNOS). It describes the services and information offered to patients by UNOS and the Organ Procurement and Transplant Network. We look forward to working with you while on the waiting list.     We would like to inform you of an important OPTN (Organ Procurement and Transplant Network) Policy change that may affect the waiting time for some candidates on our waiting list.     Waiting time is important in identifying who receives offers for kidneys. A long wait time may increase your chance of getting an offer. Wait time is based on a test called eGFR that tells how well your kidneys are working. Wait time could also be based on how long you have been on dialysis. Government and health officials have changed the way this test is used. Before this year, hospitals used an eGFR that would include your race. For Black or  Americans, this eGFR could have shown that their kidneys were working better than they were.    Because of this change, we are looking at everyones record and assessing waiting time for people who are eligible. We will be reviewing everyones medical records and will contact you if you are eligible.     Who can I talk to if I have a question?  You can contact us if you have questions or send a message through [facility].     Please give us time to answer your questions. We are working on this for many patients.    How can I learn more about eGFR and this policy change?  Go to OPTN website > Patients > Kidney > FAQ: Understanding race-neutral eGFR calculations  Full URL: https://optn.transplant.hrsa.gov/patients/by-organ/kidney/understanding-the-proposal-to-require-race-neutral-egfr-calculations/  Call the Organ Procurement and Transplantation Network (OPTN) toll-free patient  services line at 1-793.605.4325    Congratulations,    Your Transplant Partner  [facility]   [address] [place] [address]  [phone]    Cc: Dr. Tj Olguin                                                              The Organ Procurement and Transplantation Network   Toll-free patient services line: 1-524.879.4148  Your resource for organ transplant information      Staffed 8:30 am - 5:00 pm ET Monday - Friday   Leave a message 24/7 to receive a call back    The Organ Procurement and Transplantation Network (OPTN) is the national transplant system. It makes the policies that decide how donated organs are matched to patients waiting for a transplant. The OPTN:    Makes sure donated organs get matched to people on the transplant waiting list  Tells people about the donation and transplant processes  Makes sure that the public knows about the need for more organ and tissue donations    The OPTN has a free patient services line that you can call to:  Get more information about:   o Organ donation and organ transplants   o Donation and transplant policies  Get an information kit with:   o A list of transplant hospitals   o Waiting list information  Talk about any questions you may have about your transplant hospital or organ procurement organization. The staff will do their best to help you or point you to others who may help.  Find out how you can volunteer with the OPTN and help shape transplant policy    The patient services line number is: 2-192-792-6238    Patient services line staff CANNOT answer questions about your own medical care, including:  Waiting list status  Test results  Medical records  You will need to call your transplant hospital for this information.    The following websites have more information about transplantation and donation:  OPTN: https://optn.transplant.hrsa.gov/  For potential living donors and transplant recipients:   o Living with  transplant: https://www.transplantliving.org/   o Living donation process: https://optn.transplant.hrsa.gov/living-donation/     o Financial assistance: https://www.livingdonorassistance.org/  Transplantation data: https://www.srtr.org/  Organ donation: https://www.organdonor.gov/    Volunteer with the OPTN: https://optn.transplant.hrsa.gov/get-involved

## 2023-10-19 NOTE — TELEPHONE ENCOUNTER
"  KIDNEY WAIT LISTING NOTE    Date of Financial clearance to list: 10/12/23    SSN/[facility]:     Organ: Kidney    Last Name: Luz Elena  First Name: Honorio    : 1955       Gender: male        MRN#: [id number]                                   State of Permanent Residence: [address] [place] [id number]    Ethnicity: Not  or /a   Race:      Black or     CLINICAL INFORMATION   Candidate Medical Urgency Status: Active (1)  Number of Previous Kidney Transplants: 0  Number of Previous Solid Organ Transplants: 0  Did you enter number of previous kidney or other solid organ transplants? Yes  Is this Candidate a Prior Living Donor: No  (If yes, please generate letter to UNOS with patient's date of donation, recipient SSN, signed by Surgical Director after patient is listed in order to receive priority points).      ABO  ABO Blood Group:   O POS     ABO Confirmation: (THESE DATES MUST BE PRIOR TO THE LIST DATE AND SUPPORTED BY SEPARATE LAB REPORTS)    Internal Results    Lab Results   Component Value Date    GROUPTRH O POS 10/17/2023    GROUPTRH O POS 01/10/2023     No results found for: "ABO"    External Results    ABO Date 1:    ABO Date 2  Are either of these ABO results based on External Labs? No  (If Yes, STOP and go to source document in Media Tab for verification).    VITALS  Height:  6' 0"  Weight:  228 lbs  (Use height from Transplant clinic visits only).  Did you enter height/weight? Yes    HLA    Class I:  Lab Results   Component Value Date    ATWI2ZE 3 01/10/2023    GTWM7YS 68 01/10/2023    IHLZ8DC 53 01/10/2023    NTMD7KX XX 01/10/2023    SEWHS4CZ 4 01/10/2023    SMGRR3HS XX 01/10/2023    DLLCK4WQ 4 01/10/2023    TASOI3WS XX 01/10/2023       Class II:  Lab Results   Component Value Date    QWXHSY42ZT 18 01/10/2023    VZRSVV26OS 7 01/10/2023    JKACGM970HQ 52 01/10/2023    WJLXMA5919 53 01/10/2023    HWBPH0VV 2 01/10/2023    DLNLP4CM 4 01/10/2023       Tested for " "HLA Antibodies: Yes, no antibodies detected     If result is "Positive" antibodies are detected     If result is "Negative or questionable" no antibodies detected    Lab Results   Component Value Date    CIPRAS Negative 01/10/2023    CIIPRAS Negative 01/10/2023       DIALYSIS INFORMATION  Is patient Pre-Dialysis: Yes     GFR Information  Report GFR being used as the criteria for placement on the kidney list. If not, leave blank  GFR < or = 20 ml/min? Yes  If Yes, Specify value  _16.6__   ml/min     Initial date GFR became 20 or less: 1/10/23  Is GFR obtained from an Outside lab Result? No  (If YES verify with source document scanned into media)    If patient on Dialysis:    Is candidate currently on dialysis for ESRD? No  If Yes,  Date Chronic Dialysis Started:   (Not currently on dialysis)  (verify with source document in Media Tab)   Dialysis Unit Name: (Not currently on dialysis)                        Physician Name:  Dr. Dayna Guerrero  NPI#: [id number]    DIABETES INFORMATION  Primary Native Kidney Diagnosis: Diabetes Mellitus - Type II  C-Peptide Value - No results found for: "CPEPTIDE"  Current Diabetes Status: Type II    FOR NON-KIDNEY DEPARTMENT USE ONLY:  Additional Organs Registered? none    Maximum Acceptable Number of HLA Mismatches  ABDR:     6      (0-6)               AB:               (0-4)  ADR:   _____  (0-4)              BDR: _____ (0-4)  A:        _____  (0-2)              B:      _____ (0-2)          DR: ______ (0-2)    Will Recipient Accept?   Accept HBcAB Positive Organ:            Yes  Accept HBV CONRADO Positive Organ:        No  Accept HCV Antibody Positive Organ: Yes   Accept HCV CONRADO Positive Organ: Yes    Dual Kidney and En Bloc Opt In : No  Dual  Local:   No  Dual Import:   No  En Bloc Local:   No  En Bloc Import: No     Accept KDPI > 85: Single: No     Local: No     Import: No  Accept KDPI > 85: Dual: No     Local: No     Import: No    ### NURSE TO VERIFY CONSENT AND MAKE ANY NECESSARY " "CHANGES NEEDED IN UNET AT THE TIME OF VERIFICATION ###    Unacceptible Antigens  If yes, list     No results found for: "QY3UKSB", "CIABCLM", "CIIAB"    ### DO NOT LIST IF ANTIGEN VALUE WEAK ###    Hep B Vaccine series completed: no    Blood Type x2 was verified by myself and Emily MERCER  Blood type determination and reporting was completed according to the programs protocols and OPTN requirements.         "

## 2023-10-20 ENCOUNTER — TELEPHONE (OUTPATIENT)
Dept: TRANSPLANT | Facility: CLINIC | Age: 68
End: 2023-10-20
Payer: MEDICARE

## 2023-10-20 DIAGNOSIS — Z76.82 ORGAN TRANSPLANT CANDIDATE: Primary | ICD-10-CM

## 2023-10-23 ENCOUNTER — TELEPHONE (OUTPATIENT)
Dept: TRANSPLANT | Facility: CLINIC | Age: 68
End: 2023-10-23
Payer: MEDICARE

## 2023-10-23 DIAGNOSIS — Z76.82 ORGAN TRANSPLANT CANDIDATE: Primary | ICD-10-CM

## 2023-10-23 NOTE — TELEPHONE ENCOUNTER
Spoke to pt confirming appts for recurring monthly HLA labs for Nov-April. Appt reminders were mailed on 10/23/2023.

## 2023-10-23 NOTE — PROGRESS NOTES
YEARLY LIST MANAGEMENT NOTE    Honorio Luz Elena's kidney transplant listing status reviewed.  Patient is due for follow-up appointments in January 2024.  Appointments will be scheduled per protocol.  HLA sample is up to date and being received on a regular basis.

## 2023-10-24 ENCOUNTER — TELEPHONE (OUTPATIENT)
Dept: TRANSPLANT | Facility: CLINIC | Age: 68
End: 2023-10-24
Payer: MEDICARE

## 2023-10-30 ENCOUNTER — TELEPHONE (OUTPATIENT)
Dept: TRANSPLANT | Facility: CLINIC | Age: 68
End: 2023-10-30
Payer: MEDICARE

## 2023-10-30 NOTE — TELEPHONE ENCOUNTER
Spoke to pt confirming appts on 01/19/2024. Appt reminders were mailed on 10/30/2023 and pt is aware to bring care giver. A new set of reminders for pt's monthly recurring HLA labs were mailed.

## 2023-11-07 LAB — HPRA INTERPRETATION: NORMAL

## 2024-01-02 ENCOUNTER — EPISODE CHANGES (OUTPATIENT)
Dept: TRANSPLANT | Facility: CLINIC | Age: 69
End: 2024-01-02

## 2024-01-04 LAB
CLASS I ANTIBODY COMMENTS - LUMINEX: NEGATIVE
CLASS II ANTIBODY COMMENTS - LUMINEX: NORMAL
CPRA %: 0
SERUM COLLECTION DT - LUMINEX CLASS I: NORMAL
SERUM COLLECTION DT - LUMINEX CLASS II: NORMAL
SPCL1 TESTING DATE: NORMAL
SPCL2 TESTING DATE: NORMAL
SPCLU TESTING DATE: NORMAL

## 2024-01-12 ENCOUNTER — TELEPHONE (OUTPATIENT)
Dept: TRANSPLANT | Facility: CLINIC | Age: 69
End: 2024-01-12
Payer: MEDICARE

## 2024-01-18 ENCOUNTER — TELEPHONE (OUTPATIENT)
Dept: TRANSPLANT | Facility: CLINIC | Age: 69
End: 2024-01-18
Payer: MEDICARE

## 2024-01-18 NOTE — TELEPHONE ENCOUNTER
Spoke to pt confirming appts on 03/28/2024. Appt reminders were mailed on 01/18/2024 and pt is aware to bring care giver.

## 2024-02-14 ENCOUNTER — TELEPHONE (OUTPATIENT)
Dept: TRANSPLANT | Facility: CLINIC | Age: 69
End: 2024-02-14
Payer: MEDICARE

## 2024-02-16 ENCOUNTER — TELEPHONE (OUTPATIENT)
Dept: TRANSPLANT | Facility: CLINIC | Age: 69
End: 2024-02-16
Payer: MEDICARE

## 2024-02-16 NOTE — TELEPHONE ENCOUNTER
MA notes per Pre Dialysis adherence form     FOR THE PAST THREE MONTHS:    0-Appt Adhrence  0-No show    No concerns with labs, care giving, transportation, or mental health    Tj Olguin MD [place], CEM [place]  No concerns. He is a very pleasant and compliant patient.          Mirna Massey  Abdominal Transplant MA

## 2024-03-28 ENCOUNTER — HOSPITAL ENCOUNTER (OUTPATIENT)
Dept: RADIOLOGY | Facility: HOSPITAL | Age: 69
Discharge: HOME | End: 2024-03-28
Attending: NURSE PRACTITIONER
Payer: MEDICARE

## 2024-03-28 DIAGNOSIS — Z76.82 ORGAN TRANSPLANT CANDIDATE: ICD-10-CM

## 2024-03-28 PROCEDURE — 71046 X-RAY EXAM CHEST 2 VIEWS: CPT | Mod: 26,TXP,, | Performed by: RADIOLOGY

## 2024-03-28 PROCEDURE — 71046 X-RAY EXAM CHEST 2 VIEWS: CPT | Mod: TC,TXP

## 2024-04-02 DIAGNOSIS — Z76.82 ORGAN TRANSPLANT CANDIDATE: Primary | ICD-10-CM

## 2024-04-04 ENCOUNTER — TELEPHONE (OUTPATIENT)
Dept: TRANSPLANT | Facility: CLINIC | Age: 69
End: 2024-04-04
Payer: MEDICARE

## 2024-06-21 ENCOUNTER — TELEPHONE (OUTPATIENT)
Dept: TRANSPLANT | Facility: CLINIC | Age: 69
End: 2024-06-21
Payer: MEDICARE

## 2024-06-21 NOTE — TELEPHONE ENCOUNTER
"SW attempted to contact pt regarding transplant listed appointments today.     Pt did not answer. SW later contacted pt's wife who reports "Oh my goodness, he had an appointment today. He just want to work we gonna need to rescheduled. Do y'all do it on Saturdays". SW provided acknowledgement and listing education. Pt's wife verbalized understanding. SW provided transplant  number in order to reschedule appointments. Pt's wife verbalized understanding and agreement.     Pt's wife denied any further concerns and SW remains available.   "

## 2024-06-24 ENCOUNTER — TELEPHONE (OUTPATIENT)
Dept: TRANSPLANT | Facility: CLINIC | Age: 69
End: 2024-06-24
Payer: MEDICARE

## 2024-06-24 NOTE — TELEPHONE ENCOUNTER
Spoke to pt confirming rescheduled test and clinic visit on 08/20/2024. Appt reminder mailed and pt is aware to bring care giver.

## 2024-08-15 ENCOUNTER — TELEPHONE (OUTPATIENT)
Dept: TRANSPLANT | Facility: CLINIC | Age: 69
End: 2024-08-15
Payer: MEDICARE

## 2024-08-20 ENCOUNTER — TELEPHONE (OUTPATIENT)
Dept: TRANSPLANT | Facility: CLINIC | Age: 69
End: 2024-08-20
Payer: MEDICARE

## 2024-08-20 ENCOUNTER — OFFICE VISIT (OUTPATIENT)
Dept: TRANSPLANT | Facility: CLINIC | Age: 69
End: 2024-08-20
Payer: MEDICARE

## 2024-08-20 ENCOUNTER — HOSPITAL ENCOUNTER (OUTPATIENT)
Dept: CARDIOLOGY | Facility: HOSPITAL | Age: 69
Discharge: HOME | End: 2024-08-20
Attending: NURSE PRACTITIONER
Payer: MEDICARE

## 2024-08-20 VITALS
DIASTOLIC BLOOD PRESSURE: 70 MMHG | BODY MASS INDEX: 30.88 KG/M2 | HEIGHT: 72 IN | HEART RATE: 75 BPM | SYSTOLIC BLOOD PRESSURE: 140 MMHG | WEIGHT: 228 LBS

## 2024-08-20 VITALS
BODY MASS INDEX: 32.16 KG/M2 | TEMPERATURE: 98 F | HEIGHT: 72 IN | DIASTOLIC BLOOD PRESSURE: 90 MMHG | RESPIRATION RATE: 16 BRPM | WEIGHT: 237.44 LBS | HEART RATE: 59 BPM | SYSTOLIC BLOOD PRESSURE: 163 MMHG | OXYGEN SATURATION: 97 %

## 2024-08-20 DIAGNOSIS — I12.9 HYPERTENSIVE RENAL DISEASE: ICD-10-CM

## 2024-08-20 DIAGNOSIS — N18.4 CKD (CHRONIC KIDNEY DISEASE), STAGE IV: ICD-10-CM

## 2024-08-20 DIAGNOSIS — Z76.82 ORGAN TRANSPLANT CANDIDATE: ICD-10-CM

## 2024-08-20 DIAGNOSIS — Z76.82 PATIENT ON WAITING LIST FOR KIDNEY TRANSPLANT: Primary | ICD-10-CM

## 2024-08-20 LAB
ASCENDING AORTA: 3.1 CM
AV INDEX (PROSTH): 0.83
AV MEAN GRADIENT: 8 MMHG
AV PEAK GRADIENT: 16 MMHG
AV VALVE AREA BY VELOCITY RATIO: 2.44 CM²
AV VALVE AREA: 2.85 CM²
AV VELOCITY RATIO: 0.71
BSA FOR ECHO PROCEDURE: 2.29 M2
CV ECHO LV RWT: 0.37 CM
DOP CALC AO PEAK VEL: 2.01 M/S
DOP CALC AO VTI: 41.93 CM
DOP CALC LVOT AREA: 3.4 CM2
DOP CALC LVOT DIAMETER: 2.09 CM
DOP CALC LVOT PEAK VEL: 1.43 M/S
DOP CALC LVOT STROKE VOLUME: 119.4 CM3
DOP CALCLVOT PEAK VEL VTI: 34.82 CM
E WAVE DECELERATION TIME: 176.85 MSEC
E/A RATIO: 1.76
E/E' RATIO: 11.41 M/S
ECHO LV POSTERIOR WALL: 0.9 CM (ref 0.6–1.1)
FRACTIONAL SHORTENING: 39 % (ref 28–44)
INTERVENTRICULAR SEPTUM: 0.9 CM (ref 0.6–1.1)
IVRT: 59.94 MSEC
LA MAJOR: 5.69 CM
LA MINOR: 5.8 CM
LA WIDTH: 4.94 CM
LEFT ATRIUM SIZE: 3.98 CM
LEFT ATRIUM VOLUME INDEX MOD: 47.1 ML/M2
LEFT ATRIUM VOLUME INDEX: 42.7 ML/M2
LEFT ATRIUM VOLUME MOD: 106 CM3
LEFT ATRIUM VOLUME: 96 CM3
LEFT INTERNAL DIMENSION IN SYSTOLE: 2.99 CM (ref 2.1–4)
LEFT VENTRICLE DIASTOLIC VOLUME INDEX: 32.96 ML/M2
LEFT VENTRICLE DIASTOLIC VOLUME: 74.17 ML
LEFT VENTRICLE MASS INDEX: 68 G/M2
LEFT VENTRICLE SYSTOLIC VOLUME INDEX: 15.4 ML/M2
LEFT VENTRICLE SYSTOLIC VOLUME: 34.66 ML
LEFT VENTRICULAR INTERNAL DIMENSION IN DIASTOLE: 4.9 CM (ref 3.5–6)
LEFT VENTRICULAR MASS: 152.95 G
LV LATERAL E/E' RATIO: 10.78 M/S
LV SEPTAL E/E' RATIO: 12.13 M/S
MV A" WAVE DURATION": 18.55 MSEC
MV PEAK A VEL: 0.55 M/S
MV PEAK E VEL: 0.97 M/S
MV STENOSIS PRESSURE HALF TIME: 51.29 MS
MV VALVE AREA P 1/2 METHOD: 4.29 CM2
PISA TR MAX VEL: 3 M/S
PULM VEIN S/D RATIO: 0.63
PV PEAK D VEL: 0.73 M/S
PV PEAK S VEL: 0.46 M/S
RA MAJOR: 4.7 CM
RA PRESSURE ESTIMATED: 3 MMHG
RA WIDTH: 3.21 CM
RIGHT ATRIAL AREA: 17 CM2
RIGHT VENTRICLE DIASTOLIC BASEL DIMENSION: 3.5 CM
RV TB RVSP: 6 MMHG
SINUS: 3.18 CM
STJ: 2.85 CM
TDI LATERAL: 0.09 M/S
TDI SEPTAL: 0.08 M/S
TDI: 0.09 M/S
TR MAX PG: 36 MMHG
TRICUSPID ANNULAR PLANE SYSTOLIC EXCURSION: 2.88 CM
TV REST PULMONARY ARTERY PRESSURE: 39 MMHG
Z-SCORE OF LEFT VENTRICULAR DIMENSION IN END DIASTOLE: -5.09
Z-SCORE OF LEFT VENTRICULAR DIMENSION IN END SYSTOLE: -3.96

## 2024-08-20 PROCEDURE — 3077F SYST BP >= 140 MM HG: CPT | Mod: CPTII,S$GLB,TXP, | Performed by: NURSE PRACTITIONER

## 2024-08-20 PROCEDURE — 3288F FALL RISK ASSESSMENT DOCD: CPT | Mod: CPTII,S$GLB,TXP, | Performed by: NURSE PRACTITIONER

## 2024-08-20 PROCEDURE — 3080F DIAST BP >= 90 MM HG: CPT | Mod: CPTII,S$GLB,TXP, | Performed by: NURSE PRACTITIONER

## 2024-08-20 PROCEDURE — 3008F BODY MASS INDEX DOCD: CPT | Mod: CPTII,S$GLB,TXP, | Performed by: NURSE PRACTITIONER

## 2024-08-20 PROCEDURE — 93306 TTE W/DOPPLER COMPLETE: CPT | Mod: TXP

## 2024-08-20 PROCEDURE — 1126F AMNT PAIN NOTED NONE PRSNT: CPT | Mod: CPTII,S$GLB,TXP, | Performed by: NURSE PRACTITIONER

## 2024-08-20 PROCEDURE — 93306 TTE W/DOPPLER COMPLETE: CPT | Mod: 26,TXP,, | Performed by: INTERNAL MEDICINE

## 2024-08-20 PROCEDURE — 99215 OFFICE O/P EST HI 40 MIN: CPT | Mod: S$GLB,TXP,, | Performed by: NURSE PRACTITIONER

## 2024-08-20 PROCEDURE — 99999 PR PBB SHADOW E&M-EST. PATIENT-LVL IV: CPT | Mod: PBBFAC,TXP,, | Performed by: NURSE PRACTITIONER

## 2024-08-20 PROCEDURE — 1101F PT FALLS ASSESS-DOCD LE1/YR: CPT | Mod: CPTII,S$GLB,TXP, | Performed by: NURSE PRACTITIONER

## 2024-08-20 PROCEDURE — 1159F MED LIST DOCD IN RCRD: CPT | Mod: CPTII,S$GLB,TXP, | Performed by: NURSE PRACTITIONER

## 2024-08-20 PROCEDURE — 4010F ACE/ARB THERAPY RXD/TAKEN: CPT | Mod: CPTII,S$GLB,TXP, | Performed by: NURSE PRACTITIONER

## 2024-08-20 RX ORDER — LEVOCETIRIZINE DIHYDROCHLORIDE 5 MG/1
5 TABLET, FILM COATED ORAL
COMMUNITY
Start: 2024-07-17

## 2024-08-20 RX ORDER — ATORVASTATIN CALCIUM 40 MG/1
40 TABLET, FILM COATED ORAL DAILY
COMMUNITY

## 2024-08-20 RX ORDER — EMPAGLIFLOZIN 10 MG/1
10 TABLET, FILM COATED ORAL
COMMUNITY
Start: 2024-07-17

## 2024-08-20 RX ORDER — NIFEDIPINE 30 MG/1
30 TABLET, EXTENDED RELEASE ORAL
COMMUNITY

## 2024-08-20 NOTE — PROGRESS NOTES
Kidney Transplant Recipient Reevalulation    Referring Physician: Tj Olguin  Current Nephrologist: Tj Olguin  Waitlist Status: active  Dialysis Start Date: (Not currently on dialysis)    Subjective:     CC:  Six month reassessment of kidney transplant candidacy.    HPI:  Mr. Laguna is a 69 y.o. year old Black or  male with advanced kidney disease secondary to diabetic nephropathy and HTN.  He has been on the wait list for a kidney transplant at [facility] since 5/25/2021. Patient is currently pre-dialysis. He has a  none . Patient denies any recent hospitalizations or ED visits.    Hospitalizations/ED visits:  none    Interval History:   Reports doing well. 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 Still not on dialysis.     CXR: 8/20/24 unremarkable  PXR: 1/10/23 per surgeon test result is favorable for transplant  Renal US: 1/10/23 unremarkable  Iliacs : 1/10/23 per surgeon test result is favorable for transplant  Echo:  Results for orders placed during the hospital encounter of 08/20/24    Echo Saline Bubble? No    Interpretation Summary    Left Ventricle: The left ventricle is normal in size. Ventricular mass is normal. Normal wall thickness. Normal wall motion. There is normal systolic function with a visually estimated ejection fraction of 60 - 65%.    Right Ventricle: Normal right ventricular cavity size. Wall thickness is normal. Systolic function is normal.    Left Atrium: Left atrium is moderately dilated.    Aortic Valve: There is mild aortic regurgitation.    Pulmonary Artery: The estimated pulmonary artery systolic pressure is 39 mmHg.    IVC/SVC: Normal venous pressure at 3 mmHg.      Stress:(outside) 3/24/23 acceptable    Colonoscopy: 10/20/2017 10 year repat  PTH:  Lab Results   Component Value Date    .8 (H) 01/10/2023    CALCIUM 10.2 11/15/2023    PHOS 3.7 01/10/2023       PSA:  PSA, Screen (ng/mL)   Date Value   03/28/2024 2.4         Current  Outpatient Medications:     allopurinoL (ZYLOPRIM) 100 MG tablet, Take 100 mg by mouth once daily., Disp: , Rfl:     amLODIPine (NORVASC) 10 MG tablet, Take 10 mg by mouth once daily., Disp: , Rfl:     aspirin (ECOTRIN) 81 MG EC tablet, Take 81 mg by mouth once daily., Disp: , Rfl:     atorvastatin (LIPITOR) 40 MG tablet, Take 40 mg by mouth once daily., Disp: , Rfl:     carvediloL (COREG) 25 MG tablet, Take 25 mg by mouth 2 (two) times daily., Disp: , Rfl:     furosemide (LASIX) 80 MG tablet, Take 80 mg by mouth once daily., Disp: , Rfl:     JARDIANCE 10 mg tablet, Take 10 mg by mouth., Disp: , Rfl:     levocetirizine (XYZAL) 5 MG tablet, Take 5 mg by mouth., Disp: , Rfl:     losartan (COZAAR) 100 MG tablet, Take 100 mg by mouth once daily., Disp: , Rfl:     multivit-min-iron-folic-vit K1 (CENTRUM CHEWABLES) 8 mg-400 mcg- 10 mcg Chew, Take 1 tablet by mouth once daily., Disp: , Rfl:     NIFEdipine (PROCARDIA-XL) 30 MG (OSM) 24 hr tablet, Take 30 mg by mouth., Disp: , Rfl:     SOLIQUA 100/33 100 unit-33 mcg/mL InPn pen, Inject 45 Units into the skin once daily., Disp: , Rfl:     triamcinolone acetonide 0.1% (KENALOG) 0.1 % cream, SMARTSIG:Sparingly Topical Twice Daily, Disp: , Rfl:     vitamin D (VITAMIN D3) 1000 units Tab, Take 1,000 Units by mouth once daily., Disp: , Rfl:     atorvastatin (LIPITOR) 20 MG tablet, Take 20 mg by mouth every evening. (Patient not taking: Reported on 8/20/2024), Disp: , Rfl:       Past Medical History:   Diagnosis Date    Cataract     Diabetes mellitus     Diabetes mellitus, type 2     Disorder of kidney and ureter     Hyperlipidemia     Hypertension     Hyperuricemia     Skin plaque     Vitamin D deficiency        Past Surgical History:   Procedure Laterality Date    CATARACT EXTRACTION Bilateral          Review of Systems   Constitutional:  Positive for fatigue. Negative for activity change, appetite change and fever.   HENT:  Negative for congestion, mouth sores and sore throat.     Eyes:  Negative for visual disturbance.   Respiratory:  Negative for cough, chest tightness and shortness of breath.    Cardiovascular:  Negative for chest pain, palpitations and leg swelling.   Gastrointestinal:  Negative for abdominal distention, abdominal pain, constipation, diarrhea and nausea.   Genitourinary:  Negative for difficulty urinating, frequency and hematuria.   Musculoskeletal:  Negative for arthralgias and gait problem.   Skin:  Negative for wound.   Allergic/Immunologic: Negative for environmental allergies, food allergies and immunocompromised state.   Neurological:  Negative for dizziness, weakness and numbness.   Psychiatric/Behavioral:  Negative for sleep disturbance. The patient is not nervous/anxious.        Objective:   BP (!) 163/90 (BP Location: Right arm, Patient Position: Sitting, BP Method: Large (Automatic))   Pulse (!) 59   Temp 97.7 °F (36.5 °C) (Temporal)   Resp 16   Ht 6' (1.829 m)   Wt 107.7 kg (237 lb 7 oz)   SpO2 97%   BMI 32.20 kg/m²       Physical Exam  Vitals and nursing note reviewed.   Constitutional:       Appearance: Normal appearance.   HENT:      Head: Normocephalic.   Cardiovascular:      Rate and Rhythm: Normal rate and regular rhythm.      Heart sounds: Normal heart sounds.   Pulmonary:      Effort: Pulmonary effort is normal.      Breath sounds: Normal breath sounds.   Abdominal:      General: Bowel sounds are normal. There is no distension.      Palpations: Abdomen is soft.      Tenderness: There is no abdominal tenderness.   Musculoskeletal:         General: Normal range of motion.   Skin:     General: Skin is warm and dry.   Neurological:      General: No focal deficit present.      Mental Status: He is alert.   Psychiatric:         Behavior: Behavior normal.         Labs:  Lab Results   Component Value Date    WBC 6.77 01/10/2023    HGB 12.9 (L) 01/10/2023    HCT 38.6 (L) 01/10/2023     11/15/2023    K 5.1 11/15/2023     01/10/2023    CO2 30  "11/15/2023    BUN 44 (H) 11/15/2023    CREATININE 3.29 (H) 11/15/2023    EGFRNORACEVR 20 (L) 11/15/2023    CALCIUM 10.2 11/15/2023    PHOS 3.7 01/10/2023    ALBUMIN 3.9 11/15/2023    AST 16 11/15/2023    ALT 23 11/15/2023    .8 (H) 01/10/2023       No results found for: "PREALBUMIN", "BILIRUBINUA", "GGT", "AMYLASE", "LIPASE", "PROTEINUA", "NITRITE", "RBCUA", "WBCUA"    No results found for: "HLAABCTYPE"    Lab Results   Component Value Date    CPRA 0 03/18/2024    CPRA 0 03/18/2024    CPRA 0 03/18/2024    OK2JHEZ Negative 03/18/2024    CIABCLM NEGATIVE 10/17/2023    CIIAB NONE 03/18/2024    ABCMT NONE 01/08/2024       Labs were reviewed with the patient.    Pre-transplant Workup:   Reviewed with the patient.    Assessment:     1. Patient on waiting list for kidney transplant    2. CKD (chronic kidney disease), stage IV    3. Hypertensive renal disease        Plan:     Transplant Candidacy:   Mr. Laguna is a suitable kidney transplant candidate.  Meets center eligibility for accepting HCV+ donor offer - Yes.  Patient educated on HCV+ donors. Honorio is willing  to accept HCV+ donor offer -  Yes   Patient is a candidate for KDPI > 85 kidney donor offer - Yes.  He remains in overall stable health, and will remain active on the transplant list.    Patient advised that it is recommended that all transplant candidates, and their close contacts and household members receive Covid vaccination.    Yamel Villalobos NP       Follow-up:   In addition to the tests noted in the plan, Mr. Laguna will continue to have reevaluation as per the standing pre-kidney transplant protocol:  Monthly blood for PRA  Annual return to clinic, except HIV positive, > 65 years of age, or pancreas transplant candidates who will be scheduled to see transplant every 6 months while in pre-transplant phase  Annual re-testing: CXR, EKG, yearly mammograms for women over 40 and PSA for males over 40, cardiology follow-up as recommended by initial " cardiology pre-transplant evaluation  Renal ultrasound every 2 years  Baseline colonoscopy after age 50 and repeated as recommended    UNOS Patient Status  Functional Status: 90% - Able to carry on normal activity: minor symptoms of disease  Physical Capacity: No Limitations

## 2024-08-20 NOTE — LETTER
August 22, 2024        Tj Olguin  [address]  [address]  [facility] [address]  Phone: [phone]  Fax: [phone]             [facility]- Transplant 1st Fl  [address]  [facility] [address]  Phone: [phone]   Patient: Honorio Laguna   MR Number: [id number]   YOB: 1955   Date of Visit: 8/20/2024       Dear Dr. Tj Olguin    Thank you for referring Honorio Laguan to me for evaluation. Attached you will find relevant portions of my assessment and plan of care.    If you have questions, please do not hesitate to call me. I look forward to following Honorio Laguna along with you.    Sincerely,    Yamel Villalobos, NP    Enclosure    If you would like to receive this communication electronically, please contact [email or web address] or [phone] to request [facility] access.    [employer] is a tool which provides read-only access to select patient information with whom you have a relationship. Its easy to use and provides real time access to review your patients record including encounter summaries, notes, results, and demographic information.    If you feel you have received this communication in error or would no longer like to receive these types of communications, please e-mail [email or web address]

## 2024-08-20 NOTE — PROGRESS NOTES
AA YEARLY PATIENT EDUCATION NOTE    Mr. Honorio Laguna was seen in pre-kidney transplant clinic for yearly (or six months) evaluation for kidney, kidney/pancreas or pancreas only transplant.  The patient attended a web based education session that discussed/reviewed the following aspects of transplantation: UNOS waitlist management/multiple listings, types of organs offered (KDPI < 85%, KDPI > 85%, PHS increased risk, DCD, HCV+), financial aspects, surgical procedures, dietary instruction pre- and post-transplant, health maintenance pre- and post-transplant, post-transplant hospitalization and outpatient follow-up, potential to participate in a research protocol, and medication management and side effects. A question and answer session was provided after the presentation.    The patient was seen by all members of the multi-disciplinary team to include: Nephrologist/PA, , , Pharmacist and Dietician (if applicable).    The Patient was educated on OPTN policy change regarding race based eGFR. For Black or  Americans, this eGFR could have shown that their kidneys were working better than they were.    Because of this change, we are looking at everyones record and assessing waiting time for people who are eligible. We will be reviewing your medical records and will notify you if you are eligible. We also encouraged patient to provide span 20 labs that are not in our electronic medical records.    The patient reviewed and signed all consents for evaluation which were witnessed and sent to scanning into the [facility] chart.    The patient was given an education book and plan for further evaluation based on his individual assessment.      The patient was encouraged to call with any questions or concerns.

## 2024-08-20 NOTE — TELEPHONE ENCOUNTER
MA notes per Pre Dialysis adherence form     FOR THE PAST THREE MONTHS:    0-Appt Adhrence  0-No show    No concerns with labs, care giving, transportation, or mental health    Tj Olguin MD [place], Mirna, MA  Hello, no concerns for Mr. Laguna. Great person.              Mirna Massey  Abdominal Transplant MA

## 2024-08-26 NOTE — PROGRESS NOTES
Transplant Recipient Adult Psychosocial Assessment    Honorio Laguna  [address]  [facility] [address]  Telephone Information:   Mobile [phone]   Home  [phone] (home)  Work  [phone] (work)  E-mail  No e-mail address on record    Sex: male  YOB: 1955  Age: 69 y.o.    Encounter Date: 8/20/2024  [place] Citizen: yes  Primary Language: English   Needed: no    Emergency Contact:  Jennifer Laguna, 73 yo wife, YFN, does drive/own car, retired from hotel housekeeping. [phone]    Family/Social Support:   Number of dependents/: pt denies  Marital history:  to Jennifer 47 years.  Other family dynamics: Pt reports highly supportive family living nearby. Pt reports wife Jennifer, son Honorio and step-daughter Aripta Haley (all live in [facility]) will all be assisting with organ transplant. Pt reports 3 brother which live nearby but unsure if they will be able to assist with pt's transplant.      Household Composition:  Jennifer Laguna, 73 yo wife, YFN, does drive/own car, retired from hotel housekeeping. [phone]    Do you and your caregivers have access to reliable transportation? yes  PRIMARY CAREGIVER: tee Carson, will be primary caregiver, phone number  [phone]     provided in-depth information to patient and caregiver regarding pre- and post-transplant caregiver role.   strongly encourages patient and caregiver to have concrete plan regarding post-transplant care giving, including back-up caregiver(s) to ensure care giving needs are met as needed.    Patient and Caregiver states understanding all aspects of caregiver role/commitment and is able/willing/committed to being caregiver to the fullest extent necessary.    Patient and Caregiver verbalizes understanding of the education provided today and caregiver responsibilities.         remains available. Patient and Caregiver agree to contact  in  a timely manner if concerns arise.      Able to take time off work without financial concerns: yes.     Additional Significant Others who will Assist with Transplant:  Honorio Laguna Jr, 47 yo son, YFN, does drive/own car, self employed patel. [phone]  Arpita Haley, 47 yo step daughter, YFN, does drive/own car, works for [employer]. [phone]    Living Will: no  Healthcare Power of : no Pt reports trusting wife with medical decisions as needed  Advance Directives on file: <<no information> per medical record.  Verbally reviewed LW/HCPA information.   provided patient with copy of LW/HCPA documents and provided education on completion of forms.    Living Donors: Education and resource information given to patient.    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 Pt reports receives $484 per month [employer] custodial and $2,600 per month from [employer] then rest of income would be from any film industry he earns.    Able to afford all costs now and if transplanted, including medications: yes  Patient and Caregiver verbalizes understanding of personal responsibilities related to transplant costs and the importance of having a financial plan to ensure that patients transplant costs are fully covered.       provided fundraising  information/education. Patient and Caregiververbalizes understanding.   remains available.    Insurance:   Payer/Plan Subscr  Sex Relation Sub. Ins. ID Effective Group Num   1. BLUE CROSS BL* ARUN MEDINA 1955 Male Self [id number]                                   [address] [address]   2. MEDICARE - ME* ARUN MEDINA 1955 Male Self [id number]                                    [address]     Primary Insurance (for UNOS reporting): Private Insurance  Secondary Insurance (for UNOS reporting): Public Insurance - Medicare FFS (Fee For Service)  Patient and Caregiver verbalizes clear understanding that patient may experience difficulty obtaining and/or be denied insurance coverage post-surgery. This includes and is not limited to disability insurance, life insurance, health insurance, burial insurance, long term care insurance, and other insurances.      Patient and Caregiver also reports understanding that future health concerns related to or unrelated to transplantation may not be covered by patient's insurance.  Resources and information provided and reviewed.     Patient and Caregiver provides verbal permission to release any necessary information to outside resources for patient care and discharge planning.  Resources and information provided are reviewed.      Dialysis Adherence: Pt reports is not on dialysis at this time.    Infusion Service: patient utilizing? no  Home Health: patient utilizing? no  DME: yes glucometer and bpc  Pulmonary/Cardiac Rehab: pt denies   ADLS:  independent    Adherence:   Pt reports high medical compliance with self care, medication management and does drive self/own car.  Adherence education and counseling provided.     Per History Section:  Past Medical History:   Diagnosis Date    Cataract     Diabetes mellitus     Diabetes mellitus, type 2     Disorder of kidney and ureter     Hyperlipidemia     Hypertension     Hyperuricemia     Skin  plaque     Vitamin D deficiency      Social History     Tobacco Use    Smoking status: Former     Current packs/day: 0.00     Types: Cigarettes     Quit date:      Years since quittin.6    Smokeless tobacco: Never   Substance Use Topics    Alcohol use: Not Currently     Social History     Substance and Sexual Activity   Drug Use Never     Social History     Substance and Sexual Activity   Sexual Activity Not Currently       Per Today's Psychosocial:  Tobacco: none, patient denies any use.  Alcohol: none, patient denies any use.  Illicit Drugs/Non-prescribed Medications: none, patient denies any use.    Patient and Caregiver states clear understanding of the potential impact of substance use as it relates to transplant candidacy and is aware of possible random substance screening.  Substance abstinence/cessation counseling, education and resources provided and reviewed.     Arrests/DWI/Treatment/Rehab: patient denies    Psychiatric History:    Mental Health: Pt denies any mental health history or current mental health concerns. Pt denies any need for mental health referral at this time.  Psychiatrist/Counselor: pt denies  Medications:  pt denies  Suicide/Homicide Issues: pt denies any si/hi history  Safety at home: pt reports is living in safe home environment with no abuse.    Knowledge: Patient and Caregiver states having clear understanding and realistic expectations regarding the potential risks and potential benefits of organ transplantation and organ donation and agrees to discuss with health care team members and support system members, as well as to utilize available resources and express questions and/or concerns in order to further facilitate the pt informed decision-making.  Resources and information provided and reviewed.    Patient and Caregiver is aware of [facility]'s affiliation and/or partnership with agencies in home health care, LTAC, SNF, [facility], and other hospitals and  clinics.    Understanding: Patient and Caregiver reports having a clear understanding of the many lifetime commitments involved with being a transplant recipient, including costs, compliance, medications, lab work, procedures, appointments, concrete and financial planning, preparedness, timely and appropriate communication of concerns, abstinence (ETOH, tobacco, illicit non-prescribed drugs), adherence to all health care team recommendations, support system and caregiver involvement, appropriate and timely resource utilization and follow-through, mental health counseling as needed/recommended, and patient and caregiver responsibilities.  Social Service Handbook, resources and detailed educational information provided and reviewed.  Educational information provided.    Patient and Caregiver also reports current and expected compliance with health care regime and states having a clear understanding of the importance of compliance.      Patient and Caregiver reports a clear understanding that risks and benefits may be involved with organ transplantation and with organ donation.       Patient and Caregiver also reports clear understanding that psychosocial risk factors may affect patient, and include but are not limited to feelings of depression, generalized anxiety, anxiety regarding dependence on others, post traumatic stress disorder, feelings of guilt and other emotional and/or mental concerns, and/or exacerbation of existing mental health concerns.  Detailed resources provided and discussed.      Patient and Caregiver agrees to access appropriate resources in a timely manner as needed and/or as recommended, and to communicate concerns appropriately.  Patient and Caregiver also reports a clear understanding of treatment options available.     Patient and Caregiver received education in a group setting.   reviewed education, provided additional information, and answered questions.    Feelings or  Concerns: Pt reports high motivation to pursue kidney organ transplant at this time.    Coping: Identify Patient & Caregiver Strategies to Verdi:   1. Currently & Pre-transplant - family support; heidi and prayer; enjoys watching football and basket ball.   2. At the time of surgery - family support; heidi and prayer   3. During post-Transplant & Recovery Period - family support; heidi and prayer    Goals: Pt reports hope for successful kidney organ transplant so he will not be placed on dialysis and so he will have a healthier life. Patient referred to Vocational Rehabilitation.    Interview Behavior: Patient and Caregiver presents as alert and oriented x 4, pleasant, good eye contact, well groomed, recall good, concentration/judgement good, average intelligence, calm, communicative, cooperative, and asking and answering questions appropriately. Pt's highly supportive wife Jennifer (who presented via mobile) session with patient's permission.         Transplant Social Work - Candidacy  Assessment/Plan:     Psychosocial Suitability: Patient presents as low risk candidate for kidney transplant at this time. Based on psychosocial risk factors, patient presents as low risk due to patient denying any psychosocial barriers to kidney organ transplant at this time. Pt reports having organ transplant caregiver/transportation plan, medical insurance plan and plan to afford transplant costs all in place.    Recommendations/Additional Comments: Pt reports lives in [facility] will be discharged home with assistance from family after transplant hospitalization.    SW recommends that pt conduct fundraising to assist pt with pay for cost of medications, food, gas, and other transplant related needs.  SW recommends that pt remain aware of potential mental health concerns and contact the team if any concerns arise.  SW recommends that pt remain abstinent from tobacco, ETOH, and drug use.  SW supports pt's continued adherence. SW remains  available to answer any questions or concerns that arise as the pt moves through the transplant process.      Padmaja Nixon, MARTHA, LMSW

## 2024-11-15 ENCOUNTER — TELEPHONE (OUTPATIENT)
Dept: TRANSPLANT | Facility: CLINIC | Age: 69
End: 2024-11-15
Payer: MEDICARE

## 2024-11-15 DIAGNOSIS — Z76.82 PATIENT ON WAITING LIST FOR KIDNEY TRANSPLANT: Primary | ICD-10-CM

## 2025-02-24 DIAGNOSIS — Z76.82 ORGAN TRANSPLANT CANDIDATE: ICD-10-CM

## 2025-02-24 DIAGNOSIS — N18.6 END STAGE RENAL DISEASE: Primary | ICD-10-CM

## 2025-02-24 NOTE — PROGRESS NOTES
YEARLY LIST MANAGEMENT NOTE    Honorio Luz Elena's kidney transplant listing status reviewed.  Patient is due for follow-up appointments in May 2025..  Appointments will be scheduled per protocol.  HLA sample is up to date and being received on a regular basis.

## 2025-03-03 ENCOUNTER — TELEPHONE (OUTPATIENT)
Dept: TRANSPLANT | Facility: CLINIC | Age: 70
End: 2025-03-03
Payer: MEDICARE

## 2025-03-03 NOTE — LETTER
March 3, 2025    Honorio Laguna  [address]  [facility] [address]        Dear Honorio Laguna:  MRN: [id number]  : 1955    You are scheduled on 2025 for follow up in the transplant clinic to update your records and evaluate your health status as you wait for a transplant.  It is very important that we ensure you are ready for your transplant.      Please make arrangements to be in our transplant clinic from 12:30 pm- 4 pm.  During this time you will watch an educational video and then be seen by our transplant , financial counselor, and advance practice provider.     If you have had a change in your medical history since your last visit, please call your transplant coordinator to ensure we have the medical records to review prior to your appointment.     Please bring the following with you to this appointment:  A Caregiver is REQUIRED  Bring ALL insurance information including medication card  Current list of medications  Copies of any outside medical records from the past year, such as: mammogram, pap smear, ultrasound, CAT scan, colonoscopy, cardiac angiogram or cardiac stress test    To reschedule your appointments please call [phone] or [phone] ([phone]). Please ask for the .      Failure to cancel in advance or arrive on time could result in a $50 cancellation fee.  Your transplant candidacy could be affected by no showing these appointments.  We look forward to seeing you.    Sincerely,    [facility]  [address] [place] [address]  [phone]

## 2025-03-25 ENCOUNTER — LAB VISIT (OUTPATIENT)
Dept: LAB | Facility: HOSPITAL | Age: 70
End: 2025-03-25
Attending: NURSE PRACTITIONER
Payer: MEDICARE

## 2025-03-25 DIAGNOSIS — Z76.82 PATIENT ON WAITING LIST FOR KIDNEY TRANSPLANT: ICD-10-CM

## 2025-03-25 PROCEDURE — 36415 COLL VENOUS BLD VENIPUNCTURE: CPT | Mod: TXP

## 2025-04-22 ENCOUNTER — LAB VISIT (OUTPATIENT)
Dept: LAB | Facility: HOSPITAL | Age: 70
End: 2025-04-22
Payer: MEDICARE

## 2025-04-22 DIAGNOSIS — Z76.82 PATIENT ON WAITING LIST FOR KIDNEY TRANSPLANT: ICD-10-CM

## 2025-04-22 PROCEDURE — 99001 SPECIMEN HANDLING PT-LAB: CPT | Mod: TXP | Performed by: NURSE PRACTITIONER

## 2025-04-22 PROCEDURE — 36415 COLL VENOUS BLD VENIPUNCTURE: CPT | Mod: TXP

## 2025-04-28 LAB
HLA FREEZE AND HOLD INTERPRETATION: NORMAL
HLAFH COLLECTION DATE: NORMAL
HPRA INTERPRETATION: NORMAL

## 2025-05-20 ENCOUNTER — TELEPHONE (OUTPATIENT)
Dept: TRANSPLANT | Facility: CLINIC | Age: 70
End: 2025-05-20
Payer: MEDICARE

## 2025-06-06 ENCOUNTER — HOSPITAL ENCOUNTER (OUTPATIENT)
Dept: RADIOLOGY | Facility: HOSPITAL | Age: 70
Discharge: HOME | End: 2025-06-06
Attending: NURSE PRACTITIONER
Payer: MEDICARE

## 2025-06-06 ENCOUNTER — HOSPITAL ENCOUNTER (OUTPATIENT)
Dept: CARDIOLOGY | Facility: HOSPITAL | Age: 70
Discharge: HOME | End: 2025-06-06
Attending: NURSE PRACTITIONER
Payer: MEDICARE

## 2025-06-06 ENCOUNTER — OFFICE VISIT (OUTPATIENT)
Dept: TRANSPLANT | Facility: CLINIC | Age: 70
End: 2025-06-06
Payer: MEDICARE

## 2025-06-06 VITALS
BODY MASS INDEX: 30.4 KG/M2 | OXYGEN SATURATION: 96 % | HEART RATE: 74 BPM | TEMPERATURE: 97 F | DIASTOLIC BLOOD PRESSURE: 67 MMHG | SYSTOLIC BLOOD PRESSURE: 139 MMHG | RESPIRATION RATE: 18 BRPM | WEIGHT: 224.44 LBS | HEIGHT: 72 IN

## 2025-06-06 VITALS
WEIGHT: 237 LBS | SYSTOLIC BLOOD PRESSURE: 139 MMHG | DIASTOLIC BLOOD PRESSURE: 61 MMHG | BODY MASS INDEX: 32.1 KG/M2 | HEIGHT: 72 IN | RESPIRATION RATE: 16 BRPM | HEART RATE: 76 BPM

## 2025-06-06 VITALS
HEART RATE: 65 BPM | DIASTOLIC BLOOD PRESSURE: 60 MMHG | WEIGHT: 237 LBS | SYSTOLIC BLOOD PRESSURE: 130 MMHG | HEIGHT: 72 IN | BODY MASS INDEX: 32.1 KG/M2

## 2025-06-06 DIAGNOSIS — Z76.82 PATIENT ON WAITING LIST FOR KIDNEY TRANSPLANT: Primary | ICD-10-CM

## 2025-06-06 DIAGNOSIS — Z76.82 ORGAN TRANSPLANT CANDIDATE: ICD-10-CM

## 2025-06-06 DIAGNOSIS — N25.81 SECONDARY HYPERPARATHYROIDISM: ICD-10-CM

## 2025-06-06 DIAGNOSIS — I12.9 BENIGN HYPERTENSION WITH CKD (CHRONIC KIDNEY DISEASE) STAGE IV: ICD-10-CM

## 2025-06-06 DIAGNOSIS — E66.09 CLASS 1 OBESITY DUE TO EXCESS CALORIES WITH SERIOUS COMORBIDITY AND BODY MASS INDEX (BMI) OF 32.0 TO 32.9 IN ADULT: ICD-10-CM

## 2025-06-06 DIAGNOSIS — E11.22 TYPE 2 DIABETES MELLITUS WITH STAGE 4 CHRONIC KIDNEY DISEASE, WITHOUT LONG-TERM CURRENT USE OF INSULIN: ICD-10-CM

## 2025-06-06 DIAGNOSIS — E66.811 CLASS 1 OBESITY DUE TO EXCESS CALORIES WITH SERIOUS COMORBIDITY AND BODY MASS INDEX (BMI) OF 32.0 TO 32.9 IN ADULT: ICD-10-CM

## 2025-06-06 DIAGNOSIS — N18.4 BENIGN HYPERTENSION WITH CKD (CHRONIC KIDNEY DISEASE) STAGE IV: ICD-10-CM

## 2025-06-06 DIAGNOSIS — N18.4 TYPE 2 DIABETES MELLITUS WITH STAGE 4 CHRONIC KIDNEY DISEASE, WITHOUT LONG-TERM CURRENT USE OF INSULIN: ICD-10-CM

## 2025-06-06 LAB
AORTIC SIZE INDEX (SOV): 1.3 CM/M2
AORTIC SIZE INDEX: 1.4 CM/M2
ASCENDING AORTA: 3.3 CM
AV AREA BY CONTINUOUS VTI: 2.7 CM2
AV INDEX (PROSTH): 0.88
AV LVOT MEAN GRADIENT: 3 MMHG
AV LVOT PEAK GRADIENT: 5 MMHG
AV MEAN GRADIENT: 3 MMHG
AV PEAK GRADIENT: 6 MMHG
AV VALVE AREA BY VELOCITY RATIO: 3.1 CM²
AV VALVE AREA: 2.8 CM2
AV VELOCITY RATIO: 1
BSA FOR ECHO PROCEDURE: 2.34 M2
CV ECHO LV RWT: 0.51 CM
CV PHARM DOSE: 0.4 MG
CV STRESS BASE HR: 63 BPM
DIASTOLIC BLOOD PRESSURE: 63 MMHG
DOP CALC AO PEAK VEL: 1.2 M/S
DOP CALC AO VTI: 23.8 CM
DOP CALC LVOT AREA: 3.1 CM2
DOP CALC LVOT DIAMETER: 2 CM
DOP CALC LVOT PEAK VEL: 1.2 M/S
DOP CALC LVOT STROKE VOLUME: 65.9 CM3
DOP CALC RVOT VTI: 19.54 CM
DOP CALCLVOT PEAK VEL VTI: 21 CM
E WAVE DECELERATION TIME: 175 MS
E/A RATIO: 0.58
E/E' RATIO: 7 M/S
ECHO EF ESTIMATED: 76 %
ECHO LV POSTERIOR WALL: 1 CM (ref 0.6–1.1)
EJECTION FRACTION- HIGH: 65 %
END DIASTOLIC INDEX-HIGH: 153 ML/M2
END DIASTOLIC INDEX-LOW: 93 ML/M2
END SYSTOLIC INDEX-HIGH: 71 ML/M2
END SYSTOLIC INDEX-LOW: 31 ML/M2
FRACTIONAL SHORTENING: 43.6 % (ref 28–44)
INTERVENTRICULAR SEPTUM: 1.1 CM (ref 0.6–1.1)
LA MAJOR: 5.1 CM
LA MINOR: 4.4 CM
LA WIDTH: 3.9 CM
LEFT ATRIUM SIZE: 3.8 CM
LEFT ATRIUM VOLUME INDEX MOD: 22 ML/M2
LEFT ATRIUM VOLUME INDEX: 26 ML/M2
LEFT ATRIUM VOLUME MOD: 50 ML
LEFT ATRIUM VOLUME: 60 CM3
LEFT INTERNAL DIMENSION IN SYSTOLE: 2.2 CM (ref 2.1–4)
LEFT VENTRICLE DIASTOLIC VOLUME INDEX: 29.69 ML/M2
LEFT VENTRICLE DIASTOLIC VOLUME: 68 ML
LEFT VENTRICLE MASS INDEX: 57.2 G/M2
LEFT VENTRICLE SYSTOLIC VOLUME INDEX: 7 ML/M2
LEFT VENTRICLE SYSTOLIC VOLUME: 16 ML
LEFT VENTRICULAR INTERNAL DIMENSION IN DIASTOLE: 3.9 CM (ref 3.5–6)
LEFT VENTRICULAR MASS: 131 G
LV LATERAL E/E' RATIO: 7.4
LV SEPTAL E/E' RATIO: 7.4
MV PEAK A VEL: 0.89 M/S
MV PEAK E VEL: 0.52 M/S
NUC REST DIASTOLIC VOLUME INDEX: 85
NUC REST EJECTION FRACTION: 64
NUC REST SYSTOLIC VOLUME INDEX: 31
NUC STRESS DIASTOLIC VOLUME INDEX: 98
NUC STRESS EJECTION FRACTION: 65 %
NUC STRESS SYSTOLIC VOLUME INDEX: 34
OHS CV CPX 1 MINUTE RECOVERY HEART RATE: 82 BPM
OHS CV CPX 85 PERCENT MAX PREDICTED HEART RATE MALE: 128
OHS CV CPX MAX PREDICTED HEART RATE: 150
OHS CV CPX PATIENT IS FEMALE: 0
OHS CV CPX PATIENT IS MALE: 1
OHS CV CPX PEAK DIASTOLIC BLOOD PRESSURE: 57 MMHG
OHS CV CPX PEAK HEAR RATE: 65 BPM
OHS CV CPX PEAK RATE PRESSURE PRODUCT: 8970
OHS CV CPX PEAK SYSTOLIC BLOOD PRESSURE: 138 MMHG
OHS CV CPX PERCENT MAX PREDICTED HEART RATE ACHIEVED: 43
OHS CV CPX RATE PRESSURE PRODUCT PRESENTING: 8883
OHS CV INITIAL DOSE: 10.2 MCG/KG/MIN
OHS CV PEAK DOSE: 30.4 MCG/KG/MIN
OHS CV RV/LV RATIO: 0.92 CM
PISA TR MAX VEL: 2.2 M/S
RA MAJOR: 4.66 CM
RA PRESSURE ESTIMATED: 3 MMHG
RA WIDTH: 4.1 CM
RETIRED EF AND QEF - SEE NOTES: 53 %
RIGHT ATRIAL AREA: 15.5 CM2
RIGHT VENTRICLE DIASTOLIC BASEL DIMENSION: 3.6 CM
RV TB RVSP: 5 MMHG
RV TISSUE DOPPLER FREE WALL SYSTOLIC VELOCITY 1 (APICAL 4 CHAMBER VIEW): 25.64 CM/S
SINUS: 3.09 CM
STJ: 2.8 CM
SYSTOLIC BLOOD PRESSURE: 141 MMHG
TDI LATERAL: 0.07 M/S
TDI SEPTAL: 0.07 M/S
TDI: 0.07 M/S
TRICUSPID ANNULAR PLANE SYSTOLIC EXCURSION: 2.9 CM
TV PEAK GRADIENT: 19 MMHG
TV REST PULMONARY ARTERY PRESSURE: 22 MMHG
Z-SCORE OF LEFT VENTRICULAR DIMENSION IN END DIASTOLE: -8.08
Z-SCORE OF LEFT VENTRICULAR DIMENSION IN END SYSTOLE: -6.93

## 2025-06-06 PROCEDURE — 74176 CT ABD & PELVIS W/O CONTRAST: CPT | Mod: TC,TXP

## 2025-06-06 PROCEDURE — 78452 HT MUSCLE IMAGE SPECT MULT: CPT | Mod: 26,TXP,, | Performed by: INTERNAL MEDICINE

## 2025-06-06 PROCEDURE — 71046 X-RAY EXAM CHEST 2 VIEWS: CPT | Mod: 26,TXP,, | Performed by: RADIOLOGY

## 2025-06-06 PROCEDURE — 93018 CV STRESS TEST I&R ONLY: CPT | Mod: TXP,,, | Performed by: INTERNAL MEDICINE

## 2025-06-06 PROCEDURE — 1126F AMNT PAIN NOTED NONE PRSNT: CPT | Mod: CPTII,S$GLB,TXP, | Performed by: NURSE PRACTITIONER

## 2025-06-06 PROCEDURE — 99215 OFFICE O/P EST HI 40 MIN: CPT | Mod: S$GLB,TXP,, | Performed by: NURSE PRACTITIONER

## 2025-06-06 PROCEDURE — 3288F FALL RISK ASSESSMENT DOCD: CPT | Mod: CPTII,S$GLB,TXP, | Performed by: NURSE PRACTITIONER

## 2025-06-06 PROCEDURE — 3008F BODY MASS INDEX DOCD: CPT | Mod: CPTII,S$GLB,TXP, | Performed by: NURSE PRACTITIONER

## 2025-06-06 PROCEDURE — 93016 CV STRESS TEST SUPVJ ONLY: CPT | Mod: TXP,,, | Performed by: INTERNAL MEDICINE

## 2025-06-06 PROCEDURE — 1101F PT FALLS ASSESS-DOCD LE1/YR: CPT | Mod: CPTII,S$GLB,TXP, | Performed by: NURSE PRACTITIONER

## 2025-06-06 PROCEDURE — 63600175 PHARM REV CODE 636 W HCPCS: Mod: TXP | Performed by: NURSE PRACTITIONER

## 2025-06-06 PROCEDURE — 71046 X-RAY EXAM CHEST 2 VIEWS: CPT | Mod: TC,TXP

## 2025-06-06 PROCEDURE — 3075F SYST BP GE 130 - 139MM HG: CPT | Mod: CPTII,S$GLB,TXP, | Performed by: NURSE PRACTITIONER

## 2025-06-06 PROCEDURE — 78452 HT MUSCLE IMAGE SPECT MULT: CPT | Mod: TXP

## 2025-06-06 PROCEDURE — 99999 PR PBB SHADOW E&M-EST. PATIENT-LVL V: CPT | Mod: PBBFAC,TXP,, | Performed by: NURSE PRACTITIONER

## 2025-06-06 PROCEDURE — 1159F MED LIST DOCD IN RCRD: CPT | Mod: CPTII,S$GLB,TXP, | Performed by: NURSE PRACTITIONER

## 2025-06-06 PROCEDURE — A9502 TC99M TETROFOSMIN: HCPCS | Mod: TXP | Performed by: NURSE PRACTITIONER

## 2025-06-06 PROCEDURE — 74176 CT ABD & PELVIS W/O CONTRAST: CPT | Mod: 26,TXP,, | Performed by: RADIOLOGY

## 2025-06-06 PROCEDURE — 4010F ACE/ARB THERAPY RXD/TAKEN: CPT | Mod: CPTII,S$GLB,TXP, | Performed by: NURSE PRACTITIONER

## 2025-06-06 PROCEDURE — 3078F DIAST BP <80 MM HG: CPT | Mod: CPTII,S$GLB,TXP, | Performed by: NURSE PRACTITIONER

## 2025-06-06 PROCEDURE — 93306 TTE W/DOPPLER COMPLETE: CPT | Mod: TXP

## 2025-06-06 PROCEDURE — 93306 TTE W/DOPPLER COMPLETE: CPT | Mod: 26,TXP,, | Performed by: STUDENT IN AN ORGANIZED HEALTH CARE EDUCATION/TRAINING PROGRAM

## 2025-06-06 PROCEDURE — 1160F RVW MEDS BY RX/DR IN RCRD: CPT | Mod: CPTII,S$GLB,TXP, | Performed by: NURSE PRACTITIONER

## 2025-06-06 RX ORDER — REGADENOSON 0.08 MG/ML
0.4 INJECTION, SOLUTION INTRAVENOUS
Status: COMPLETED | OUTPATIENT
Start: 2025-06-06 | End: 2025-06-06

## 2025-06-06 RX ORDER — AMINOPHYLLINE 25 MG/ML
75 INJECTION, SOLUTION INTRAVENOUS
Status: COMPLETED | OUTPATIENT
Start: 2025-06-06 | End: 2025-06-06

## 2025-06-06 RX ADMIN — TETROFOSMIN 10.2 MILLICURIE: 1.38 INJECTION, POWDER, LYOPHILIZED, FOR SOLUTION INTRAVENOUS at 07:06

## 2025-06-06 RX ADMIN — REGADENOSON 0.4 MG: 0.08 INJECTION, SOLUTION INTRAVENOUS at 08:06

## 2025-06-06 RX ADMIN — TETROFOSMIN 30.4 MILLICURIE: 1.38 INJECTION, POWDER, LYOPHILIZED, FOR SOLUTION INTRAVENOUS at 08:06

## 2025-06-06 RX ADMIN — AMINOPHYLLINE 75 MG: 25 INJECTION, SOLUTION INTRAVENOUS at 08:06

## 2025-06-06 NOTE — PROGRESS NOTES
AA YEARLY PATIENT EDUCATION NOTE    Mr. Honorio Laguna was seen in pre-kidney transplant clinic for yearly (or six months) evaluation for kidney, kidney/pancreas or pancreas only transplant.  The patient attended a web based education session that discussed/reviewed the following aspects of transplantation: UNOS waitlist management/multiple listings, types of organs offered (KDPI < 85%, KDPI > 85%, PHS increased risk, DCD, HCV+), financial aspects, surgical procedures, dietary instruction pre- and post-transplant, health maintenance pre- and post-transplant, post-transplant hospitalization and outpatient follow-up, potential to participate in a research protocol, and medication management and side effects. A question and answer session was provided after the presentation.    The patient was seen by all members of the multi-disciplinary team to include: Nephrologist/PA, , , Pharmacist and Dietician (if applicable).    The Patient was educated on OPTN policy change regarding race based eGFR. For Black or  Americans, this eGFR could have shown that their kidneys were working better than they were.    Because of this change, we are looking at everyones record and assessing waiting time for people who are eligible. We will be reviewing your medical records and will notify you if you are eligible. We also encouraged patient to provide span 20 labs that are not in our electronic medical records.    The patient reviewed and signed all consents for evaluation which were witnessed and sent to scanning into the [facility] chart.    The patient was given an education book and plan for further evaluation based on his individual assessment.      The patient was encouraged to call with any questions or concerns.

## 2025-06-06 NOTE — PROGRESS NOTES
Kidney Transplant Recipient Reevalulation    Referring Physician: Tj Olguin  Current Nephrologist: Tj Olguin  Waitlist Status: active  Dialysis Start Date: (Not currently on dialysis)    Subjective:     CC:  Six month reassessment of kidney transplant candidacy.    HPI:  Mr. Laguna is a 70 y.o. year old Black or  male with advanced kidney disease secondary to diabetic nephropathy and HTN.  He has been on the wait list for a kidney transplant at [facility] since 5/25/2021. Patient is currently pre-dialysis. He has a none. Patient denies any recent hospitalizations or ED visits.    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 Still not on dialysis.     CXR: 8/20/24 unremarkable  PXR: 1/10/23 per surgeon test result is favorable for transplant  Renal US: 1/10/23 unremarkable  Iliacs : 1/10/23 per surgeon test result is favorable for transplant  Echo: 6/6/25 EF 55-60%, PA 22  Lexiscan: 6/6/25 Normal myocardial perfusion scan. There is no evidence of myocardial ischemia or infarction.        Colonoscopy: 10/20/2017 10 year repat         PSA:  PSA, Screen (ng/mL)   Date Value   03/28/2024 2.4       Past Medical History:   Diagnosis Date    Cataract     Diabetes mellitus     Diabetes mellitus, type 2     Disorder of kidney and ureter     Hyperlipidemia     Hypertension     Hyperuricemia     Skin plaque     Vitamin D deficiency        Past Surgical History:   Procedure Laterality Date    CATARACT EXTRACTION Bilateral          Review of Systems   Constitutional:  Negative for activity change, appetite change and fever.   HENT:  Negative for congestion, mouth sores and sore throat.    Eyes:  Negative for visual disturbance.   Respiratory:  Negative for cough, chest tightness and shortness of breath.    Cardiovascular:  Negative for chest pain, palpitations and leg swelling.  "  Gastrointestinal:  Negative for abdominal distention, abdominal pain, constipation, diarrhea and nausea.   Genitourinary:  Negative for difficulty urinating, frequency and hematuria.   Musculoskeletal:  Negative for arthralgias and gait problem.   Skin:  Negative for wound.   Allergic/Immunologic: Negative for environmental allergies, food allergies and immunocompromised state.   Neurological:  Negative for dizziness, weakness and numbness.   Psychiatric/Behavioral:  Negative for sleep disturbance. The patient is not nervous/anxious.        Objective:   /67   Pulse 74   Temp 97.3 °F (36.3 °C) (Tympanic)   Resp 18   Ht 5' 11.85" (1.825 m)   Wt 101.8 kg (224 lb 6.9 oz)   SpO2 96%   BMI 30.57 kg/m²       Physical Exam  Vitals and nursing note reviewed.   Constitutional:       Appearance: Normal appearance.   HENT:      Head: Normocephalic.   Cardiovascular:      Rate and Rhythm: Normal rate and regular rhythm.      Heart sounds: Normal heart sounds.   Pulmonary:      Effort: Pulmonary effort is normal.      Breath sounds: Normal breath sounds.   Abdominal:      General: Bowel sounds are normal. There is no distension.      Palpations: Abdomen is soft.      Tenderness: There is no abdominal tenderness.   Musculoskeletal:         General: Normal range of motion.   Skin:     General: Skin is warm and dry.   Neurological:      General: No focal deficit present.      Mental Status: He is alert.   Psychiatric:         Behavior: Behavior normal.         Labs:  Lab Results   Component Value Date    WBC 6.77 01/10/2023    HGB 12.9 (L) 01/10/2023    HCT 38.6 (L) 01/10/2023     11/05/2024    K 4.2 11/05/2024     01/10/2023    CO2 26 11/05/2024    BUN 51 (H) 11/05/2024    CREATININE 4.01 (H) 11/05/2024    EGFRNORACEVR 15 (L) 11/05/2024    CALCIUM 9.2 11/05/2024    PHOS 3.7 01/10/2023    ALBUMIN 3.9 11/05/2024    AST 14 11/05/2024    ALT 13 11/05/2024    .8 (H) 01/10/2023       No results found " "for: "PREALBUMIN", "BILIRUBINUA", "GGT", "AMYLASE", "LIPASE", "PROTEINUA", "NITRITE", "RBCUA", "WBCUA"    No results found for: "HLAABCTYPE"    Lab Results   Component Value Date    CPRA 0 03/25/2025    CPRA 0 03/25/2025    CPRA 0 03/25/2025    QV8SYFQ Negative 03/25/2025    CIABCLM NEGATIVE 10/17/2023    CIIAB NONE 03/18/2024    ABCMT NONE 03/25/2025       Labs were reviewed with the patient.    Pre-transplant Workup:   Reviewed with the patient.    Assessment:     1. Patient on waiting list for kidney transplant    2. Benign hypertension with CKD (chronic kidney disease) stage IV    3. Type 2 diabetes mellitus with stage 4 chronic kidney disease, without long-term current use of insulin    4. Secondary hyperparathyroidism    5. Class 1 obesity due to excess calories with serious comorbidity and body mass index (BMI) of 32.0 to 32.9 in adult          Plan:   CXR, Renal US and CTA/P WO--scheduled this afternoon    Transplant Candidacy:   Mr. Laguna is a suitable kidney transplant candidate.  Meets center eligibility for accepting HCV+ donor offer - Yes.  Patient educated on HCV+ donors. Honorio is willing  to accept HCV+ donor offer -  Yes   Patient is a candidate for KDPI > 85 kidney donor offer - Yes.  He remains in overall stable health, and will remain active on the transplant list.    Patient advised that it is recommended that all transplant candidates, and their close contacts and household members receive Covid vaccination.    Hien Burger NP       Follow-up:   In addition to the tests noted in the plan, Mr. Laguna will continue to have reevaluation as per the standing pre-kidney transplant protocol:  Monthly blood for PRA  Annual return to clinic, except HIV positive, > 65 years of age, or pancreas transplant candidates who will be scheduled to see transplant every 6 months while in pre-transplant phase  Annual re-testing: CXR, EKG, yearly mammograms for women over 40 and PSA for males over 40, cardiology " follow-up as recommended by initial cardiology pre-transplant evaluation  Renal ultrasound every 2 years  Baseline colonoscopy after age 50 and repeated as recommended    UNOS Patient Status  Functional Status: 90% - Able to carry on normal activity: minor symptoms of disease  Physical Capacity: No Limitations

## 2025-06-06 NOTE — LETTER
June 9, 2025        Tj Olguin  [address] [place] [address]  Phone: [phone]  Fax: [phone]             [facility]- Transplant 1st Fl  [address]  [facility] [address]  Phone: [phone]   Patient: Honorio Laguna   MR Number: [id number]   YOB: 1955   Date of Visit: 6/6/2025       Dear Dr. Tj Olguin    Thank you for referring Honorio Laguna to me for evaluation. Attached you will find relevant portions of my assessment and plan of care.    If you have questions, please do not hesitate to call me. I look forward to following Honorio Laguna along with you.    Sincerely,    Hien Burger, NP    Enclosure    If you would like to receive this communication electronically, please contact [email or web address] or [phone] to request [employer] access.    [employer] is a tool which provides read-only access to select patient information with whom you have a relationship. Its easy to use and provides real time access to review your patients record including encounter summaries, notes, results, and demographic information.    If you feel you have received this communication in error or would no longer like to receive these types of communications, please e-mail [email or web address]

## 2025-06-16 ENCOUNTER — RESULTS FOLLOW-UP (OUTPATIENT)
Dept: TRANSPLANT | Facility: HOSPITAL | Age: 70
End: 2025-06-16

## 2025-06-17 ENCOUNTER — TELEPHONE (OUTPATIENT)
Dept: TRANSPLANT | Facility: CLINIC | Age: 70
End: 2025-06-17
Payer: MEDICARE

## 2025-06-17 DIAGNOSIS — N18.6 END STAGE RENAL DISEASE: Primary | ICD-10-CM

## 2025-06-17 DIAGNOSIS — Z76.82 ORGAN TRANSPLANT CANDIDATE: ICD-10-CM

## 2025-06-30 ENCOUNTER — HOSPITAL ENCOUNTER (OUTPATIENT)
Dept: RADIOLOGY | Facility: HOSPITAL | Age: 70
Discharge: HOME | End: 2025-06-30
Attending: NURSE PRACTITIONER
Payer: MEDICARE

## 2025-06-30 DIAGNOSIS — Z76.82 ORGAN TRANSPLANT CANDIDATE: ICD-10-CM

## 2025-06-30 PROCEDURE — 76770 US EXAM ABDO BACK WALL COMP: CPT | Mod: 26,TXP,, | Performed by: RADIOLOGY

## 2025-06-30 PROCEDURE — 76770 US EXAM ABDO BACK WALL COMP: CPT | Mod: TC,TXP

## 2025-08-19 ENCOUNTER — TELEPHONE (OUTPATIENT)
Dept: TRANSPLANT | Facility: CLINIC | Age: 70
End: 2025-08-19
Payer: MEDICARE

## 2025-08-19 DIAGNOSIS — Z76.82 AWAITING ORGAN TRANSPLANT: Primary | ICD-10-CM

## 2025-08-20 ENCOUNTER — TELEPHONE (OUTPATIENT)
Dept: TRANSPLANT | Facility: CLINIC | Age: 70
End: 2025-08-20
Payer: MEDICARE

## 2025-08-26 ENCOUNTER — LAB VISIT (OUTPATIENT)
Dept: LAB | Facility: HOSPITAL | Age: 70
End: 2025-08-26
Payer: MEDICARE